# Patient Record
Sex: FEMALE | Race: WHITE | Employment: OTHER | URBAN - METROPOLITAN AREA
[De-identification: names, ages, dates, MRNs, and addresses within clinical notes are randomized per-mention and may not be internally consistent; named-entity substitution may affect disease eponyms.]

---

## 2018-12-10 ENCOUNTER — HOSPITAL ENCOUNTER (OUTPATIENT)
Dept: LAB | Age: 63
Discharge: HOME | End: 2018-12-10
Payer: COMMERCIAL

## 2018-12-10 ENCOUNTER — OFFICE VISIT (OUTPATIENT)
Dept: FAMILY MEDICINE CLINIC | Age: 63
End: 2018-12-10

## 2018-12-10 VITALS
TEMPERATURE: 97.4 F | BODY MASS INDEX: 29.19 KG/M2 | SYSTOLIC BLOOD PRESSURE: 112 MMHG | HEART RATE: 93 BPM | WEIGHT: 171 LBS | RESPIRATION RATE: 18 BRPM | HEIGHT: 64 IN | OXYGEN SATURATION: 96 % | DIASTOLIC BLOOD PRESSURE: 63 MMHG

## 2018-12-10 DIAGNOSIS — E66.3 OVERWEIGHT (BMI 25.0-29.9): ICD-10-CM

## 2018-12-10 DIAGNOSIS — Z01.89 ENCOUNTER FOR LABORATORY TEST: ICD-10-CM

## 2018-12-10 DIAGNOSIS — E07.9 THYROID DISORDER: ICD-10-CM

## 2018-12-10 DIAGNOSIS — Z00.00 GENERAL MEDICAL EXAM: Primary | ICD-10-CM

## 2018-12-10 DIAGNOSIS — F39 MOOD DISORDER (HCC): ICD-10-CM

## 2018-12-10 DIAGNOSIS — Z00.00 GENERAL MEDICAL EXAM: ICD-10-CM

## 2018-12-10 LAB
ALBUMIN SERPL-MCNC: 4 G/DL (ref 3.4–5)
ALBUMIN/GLOB SERPL: 1.2 {RATIO} (ref 0.8–1.7)
ALP SERPL-CCNC: 89 U/L (ref 45–117)
ALT SERPL-CCNC: 56 U/L (ref 13–56)
ANION GAP SERPL CALC-SCNC: 7 MMOL/L (ref 3–18)
AST SERPL-CCNC: 24 U/L (ref 15–37)
BASOPHILS # BLD: 0 K/UL (ref 0–0.1)
BASOPHILS NFR BLD: 0 % (ref 0–2)
BILIRUB SERPL-MCNC: 0.7 MG/DL (ref 0.2–1)
BUN SERPL-MCNC: 13 MG/DL (ref 7–18)
BUN/CREAT SERPL: 12 (ref 12–20)
CALCIUM SERPL-MCNC: 9.3 MG/DL (ref 8.5–10.1)
CHLORIDE SERPL-SCNC: 108 MMOL/L (ref 100–108)
CHOLEST SERPL-MCNC: 223 MG/DL
CO2 SERPL-SCNC: 26 MMOL/L (ref 21–32)
CREAT SERPL-MCNC: 1.09 MG/DL (ref 0.6–1.3)
DIFFERENTIAL METHOD BLD: ABNORMAL
EOSINOPHIL # BLD: 0.2 K/UL (ref 0–0.4)
EOSINOPHIL NFR BLD: 2 % (ref 0–5)
ERYTHROCYTE [DISTWIDTH] IN BLOOD BY AUTOMATED COUNT: 13.3 % (ref 11.6–14.5)
GLOBULIN SER CALC-MCNC: 3.3 G/DL (ref 2–4)
GLUCOSE SERPL-MCNC: 86 MG/DL (ref 74–99)
HCT VFR BLD AUTO: 45.3 % (ref 35–45)
HDLC SERPL-MCNC: 35 MG/DL (ref 40–60)
HDLC SERPL: 6.4 {RATIO} (ref 0–5)
HGB BLD-MCNC: 14.5 G/DL (ref 12–16)
LDLC SERPL CALC-MCNC: ABNORMAL MG/DL (ref 0–100)
LIPID PROFILE,FLP: ABNORMAL
LYMPHOCYTES # BLD: 3.1 K/UL (ref 0.9–3.6)
LYMPHOCYTES NFR BLD: 31 % (ref 21–52)
MCH RBC QN AUTO: 30.3 PG (ref 24–34)
MCHC RBC AUTO-ENTMCNC: 32 G/DL (ref 31–37)
MCV RBC AUTO: 94.8 FL (ref 74–97)
MONOCYTES # BLD: 0.5 K/UL (ref 0.05–1.2)
MONOCYTES NFR BLD: 5 % (ref 3–10)
NEUTS SEG # BLD: 6.3 K/UL (ref 1.8–8)
NEUTS SEG NFR BLD: 62 % (ref 40–73)
PLATELET # BLD AUTO: 309 K/UL (ref 135–420)
PMV BLD AUTO: 12.5 FL (ref 9.2–11.8)
POTASSIUM SERPL-SCNC: 4.4 MMOL/L (ref 3.5–5.5)
PROT SERPL-MCNC: 7.3 G/DL (ref 6.4–8.2)
RBC # BLD AUTO: 4.78 M/UL (ref 4.2–5.3)
SODIUM SERPL-SCNC: 141 MMOL/L (ref 136–145)
TRIGL SERPL-MCNC: 580 MG/DL (ref ?–150)
TSH SERPL DL<=0.05 MIU/L-ACNC: 1.2 UIU/ML (ref 0.36–3.74)
VLDLC SERPL CALC-MCNC: ABNORMAL MG/DL
WBC # BLD AUTO: 10 K/UL (ref 4.6–13.2)

## 2018-12-10 PROCEDURE — 80061 LIPID PANEL: CPT

## 2018-12-10 PROCEDURE — 80053 COMPREHEN METABOLIC PANEL: CPT

## 2018-12-10 PROCEDURE — 84443 ASSAY THYROID STIM HORMONE: CPT

## 2018-12-10 PROCEDURE — 85025 COMPLETE CBC W/AUTO DIFF WBC: CPT

## 2018-12-10 RX ORDER — SERTRALINE HYDROCHLORIDE 25 MG/1
25 TABLET, FILM COATED ORAL DAILY
Qty: 30 TAB | Refills: 1 | Status: SHIPPED | OUTPATIENT
Start: 2018-12-10 | End: 2019-01-04 | Stop reason: SDUPTHER

## 2018-12-10 RX ORDER — VALACYCLOVIR HYDROCHLORIDE 500 MG/1
TABLET, FILM COATED ORAL DAILY
COMMUNITY

## 2018-12-10 RX ORDER — ALENDRONATE SODIUM 35 MG/1
TABLET ORAL
COMMUNITY

## 2018-12-10 RX ORDER — GLUCOSAMINE/CHONDR SU A SOD 750-600 MG
2 TABLET ORAL DAILY
COMMUNITY

## 2018-12-10 RX ORDER — GLUCOSAMINE SULFATE 1500 MG
POWDER IN PACKET (EA) ORAL DAILY
COMMUNITY
End: 2022-06-01 | Stop reason: ALTCHOICE

## 2018-12-10 NOTE — PROGRESS NOTES
Chief Complaint Patient presents with [facility] Establish Care New Patient 1. Have you been to the ER, urgent care clinic since your last visit? Hospitalized since your last visit? No 
 
2. Have you seen or consulted any other health care providers outside of the [address] [place] [address] since your last visit? Include any pap smears or colon screening. No  
 
HPI Mia Sorensen comes in to establish care. Patient would like to have a general medical exam done. She does have episodes of anxiety that she is result in palpitations. These come on and off. No syncope or diaphoresis. She denies chest pain or shortness of breath. Discussed anxiety and the impact it can have on activities of daily living and quality of life. After discussion she is willing to try medication to help control anxiety. I will start her on sertraline 25 mg daily. I will follow-up in 1 month to see how she is doing. Patient states that in the past she has used medication for anxiety. She took this for about 6 months and it did help with symptoms. I will check labs today. I will also check her thyroid level. Patient does have a family history of thyroid disorder. Patient has a BMI of 29.35. We discussed normal BMI. She will do lifestyle and dietary modification to cut back on her weight. Past Medical History History reviewed. No pertinent past medical history. Surgical History Past Surgical History:  
Procedure Laterality Date  HX HYSTERECTOMY  2009 Medications Current Outpatient Medications Medication Sig Dispense Refill  valACYclovir (VALTREX) 500 mg tablet Take  by mouth daily.  alendronate (FOSAMAX) 35 mg tablet Take  by mouth every seven (7) days.  fish oil-omega-3 fatty acids 340-1,000 mg capsule Take 2 Caps by mouth daily.  glucosamine/chondr govea A sod (GLUCOSAMINE-CHONDROITIN) 750-600 mg tab Take 2 Tabs by mouth daily.  cholecalciferol (VITAMIN D3) 1,000 unit cap Take  by mouth daily. Allergies Allergies Allergen Reactions  Pcn [Penicillins] Unknown (comments) Family History Family History Problem Relation Age of Onset  Cancer Mother  Glaucoma Mother  Dementia Mother  Heart Disease Father  Cancer Maternal Grandmother Social History Social History Socioeconomic History  Marital status:  Spouse name: Not on file  Number of children: Not on file  Years of education: Not on file  Highest education level: Not on file Social Needs  Financial resource strain: Not on file  Food insecurity - worry: Not on file  Food insecurity - inability: Not on file  Transportation needs - medical: Not on file  Transportation needs - non-medical: Not on file Occupational History  Not on file Tobacco Use  Smoking status: Light Tobacco Smoker  Smokeless tobacco: Never Used Substance and Sexual Activity  Alcohol use: Yes Comment: social  
 Drug use: No  
 Sexual activity: Not on file Other Topics Concern  Not on file Social History Narrative  Not on file Review of Systems Review of Systems - History obtained from the patient General ROS: positive for  - fatigue and malaise 
negative for - chills or fever Psychological ROS: positive for - anxiety Ophthalmic ROS: negative ENT ROS: negative Allergy and Immunology ROS: negative Hematological and Lymphatic ROS: negative Endocrine ROS: negative Respiratory ROS: no cough, shortness of breath, or wheezing Cardiovascular ROS: no chest pain or dyspnea on exertion Gastrointestinal ROS: no abdominal pain, change in bowel habits, or black or bloody stools Genito-Urinary ROS: no dysuria, trouble voiding, or hematuria Musculoskeletal ROS: negative Neurological ROS: no TIA or stroke symptoms Dermatological ROS: negative Vital Signs Visit Vitals /63 (BP 1 Location: Left arm, BP Patient Position: Sitting) Pulse 93 Temp 97.4 °F (36.3 °C) (Oral) Resp 18 Ht 5' 4\" (1.626 m) Wt 171 lb (77.6 kg) SpO2 96% BMI 29.35 kg/m² Physical Exam 
Physical Examination: General appearance - alert, well appearing, and in no distress, oriented to person, place, and time, overweight, acyanotic, in no respiratory distress and well hydrated Mental status - alert, oriented to person, place, and time, normal mood, behavior, speech, dress, motor activity, and thought processes Eyes - pupils equal and reactive, extraocular eye movements intact Ears - bilateral TM's and external ear canals normal 
Nose - normal and patent, no erythema, discharge or polyps Mouth - mucous membranes moist, pharynx normal without lesions Neck - supple, no significant adenopathy Lymphatics - no palpable lymphadenopathy, no hepatosplenomegaly Chest - clear to auscultation, no wheezes, rales or rhonchi, symmetric air entry Heart - normal rate, regular rhythm, normal S1, S2, no murmurs, rubs, clicks or gallops Abdomen - soft, nontender, nondistended, no masses or organomegaly Back exam - full range of motion, no tenderness, palpable spasm or pain on motion Neurological - alert, oriented, normal speech, no focal findings or movement disorder noted Musculoskeletal - no joint tenderness, deformity or swelling Extremities - peripheral pulses normal, no pedal edema, no clubbing or cyanosis Results No results found for this or any previous visit. ASSESSMENT and PLAN 
  ICD-10-CM ICD-9-CM 1. General medical exam Z00.00 V70.9 CBC WITH AUTOMATED DIFF  
   METABOLIC PANEL, COMPREHENSIVE  
   LIPID PANEL 2. Mood disorder (HCC) F39 296.90 sertraline (ZOLOFT) 25 mg tablet 3. Overweight (BMI 25.0-29. 9) E66.3 278.02   
4. Thyroid disorder E07.9 246.9 TSH 3RD GENERATION 5.  Encounter for laboratory test Z01.89 V72.60 COLLECTION VENOUS BLOOD,VENIPUNCTURE  
 
 lab results and schedule of future lab studies reviewed with patient 
reviewed diet, exercise and weight control 
cardiovascular risk and specific lipid/LDL goals reviewed 
reviewed medications and side effects in detail Discussed the patient's BMI with her. The BMI follow up plan is as follows:  
dietary management education, guidance, and counseling 
encourage exercise 
monitor weight I have discussed the diagnosis with the patient and the intended plan of care as seen in the above orders. The patient has received an after-visit summary and questions were answered concerning future plans. I have discussed medication, side effects, and warnings with the patient in detail. The patient verbalized understanding and is in agreement with the plan of care. The patient will contact the office with any additional concerns.  
 
Wenceslao Romero MD

## 2018-12-10 NOTE — PATIENT INSTRUCTIONS
Body Mass Index: Care Instructions Your Care Instructions Body mass index (BMI) can help you see if your weight is raising your risk for health problems. It uses a formula to compare how much you weigh with how tall you are. · A BMI lower than 18.5 is considered underweight. · A BMI between 18.5 and 24.9 is considered healthy. · A BMI between 25 and 29.9 is considered overweight. A BMI of 30 or higher is considered obese. If your BMI is in the normal range, it means that you have a lower risk for weight-related health problems. If your BMI is in the overweight or obese range, you may be at increased risk for weight-related health problems, such as high blood pressure, heart disease, stroke, arthritis or joint pain, and diabetes. If your BMI is in the underweight range, you may be at increased risk for health problems such as fatigue, lower protection (immunity) against illness, muscle loss, bone loss, hair loss, and hormone problems. BMI is just one measure of your risk for weight-related health problems. You may be at higher risk for health problems if you are not active, you eat an unhealthy diet, or you drink too much alcohol or use tobacco products. Follow-up care is a key part of your treatment and safety. Be sure to make and go to all appointments, and call your doctor if you are having problems. It's also a good idea to know your test results and keep a list of the medicines you take. How can you care for yourself at home? · Practice healthy eating habits. This includes eating plenty of fruits, vegetables, whole grains, lean protein, and low-fat dairy. · If your doctor recommends it, get more exercise. Walking is a good choice. Bit by bit, increase the amount you walk every day. Try for at least 30 minutes on most days of the week. · Do not smoke. Smoking can increase your risk for health problems.  If you need help quitting, talk to your doctor about stop-smoking programs and medicines. These can increase your chances of quitting for good. · Limit alcohol to 2 drinks a day for men and 1 drink a day for women. Too much alcohol can cause health problems. If you have a BMI higher than 25 · Your doctor may do other tests to check your risk for weight-related health problems. This may include measuring the distance around your waist. A waist measurement of more than 40 inches in men or 35 inches in women can increase the risk of weight-related health problems. · Talk with your doctor about steps you can take to stay healthy or improve your health. You may need to make lifestyle changes to lose weight and stay healthy, such as changing your diet and getting regular exercise. If you have a BMI lower than 18.5 · Your doctor may do other tests to check your risk for health problems. · Talk with your doctor about steps you can take to stay healthy or improve your health. You may need to make lifestyle changes to gain or maintain weight and stay healthy, such as getting more healthy foods in your diet and doing exercises to build muscle. Where can you learn more? Go to http://nadine-min.info/. Enter S176 in the search box to learn more about \"Body Mass Index: Care Instructions. \" Current as of: October 13, 2016 Content Version: 11.4 © 9193-0060 [employer]. Care instructions adapted under license by [employer] (which disclaims liability or warranty for this information). If you have questions about a medical condition or this instruction, always ask your healthcare professional. [address] any warranty or liability for your use of this information.

## 2018-12-17 NOTE — PROGRESS NOTES
Please let patient know that her cholesterol level is elevated. Her triglyceride levels are also elevated. She is taking fish oil. I would recommend taking a statin or other cholesterol-lowering medication. She should also exercise and take a diet low in poorly saturated fats. If she is agreeable to this she should let us know and we will send in a prescription to her pharmacy. Recheck labs in 3 months.   Bekah Currie MD

## 2018-12-20 NOTE — PROGRESS NOTES
Patient returned call. Verified name and . Advised of lab results. She expresses understanding. She mentions that she has taken Simvastatin in the past but developed severe back pain and extreme exhaustion while taking it. She wants to know if there is an alternative to the Simvastatin. She uses [facility] in [place]. Advised I would check with Dr. Dottie Mullins and give her a call back.

## 2018-12-31 RX ORDER — EZETIMIBE 10 MG/1
10 TABLET ORAL DAILY
Qty: 30 TAB | Refills: 3 | Status: SHIPPED | OUTPATIENT
Start: 2018-12-31 | End: 2019-03-25 | Stop reason: SDUPTHER

## 2019-01-04 DIAGNOSIS — F39 MOOD DISORDER (HCC): ICD-10-CM

## 2019-01-04 RX ORDER — SERTRALINE HYDROCHLORIDE 25 MG/1
25 TABLET, FILM COATED ORAL DAILY
Qty: 90 TAB | Refills: 0 | Status: SHIPPED | OUTPATIENT
Start: 2019-01-04 | End: 2019-03-28 | Stop reason: SDUPTHER

## 2019-01-07 NOTE — PROGRESS NOTES
Patient states she has already received the Zetia Rx and began taking that. Advised to continue the medication if she is not having any issues and can discuss this further at her appt on 1/10/19.

## 2019-01-10 ENCOUNTER — OFFICE VISIT (OUTPATIENT)
Dept: FAMILY MEDICINE CLINIC | Age: 64
End: 2019-01-10

## 2019-01-10 VITALS
RESPIRATION RATE: 18 BRPM | BODY MASS INDEX: 28.85 KG/M2 | HEART RATE: 84 BPM | TEMPERATURE: 96.7 F | WEIGHT: 169 LBS | OXYGEN SATURATION: 98 % | HEIGHT: 64 IN | DIASTOLIC BLOOD PRESSURE: 73 MMHG | SYSTOLIC BLOOD PRESSURE: 117 MMHG

## 2019-01-10 DIAGNOSIS — E78.5 DYSLIPIDEMIA: ICD-10-CM

## 2019-01-10 DIAGNOSIS — F39 MOOD DISORDER (HCC): Primary | ICD-10-CM

## 2019-01-10 DIAGNOSIS — Z01.89 ENCOUNTER FOR LABORATORY TEST: ICD-10-CM

## 2019-01-10 DIAGNOSIS — Z11.59 NEED FOR HEPATITIS C SCREENING TEST: ICD-10-CM

## 2019-02-06 DIAGNOSIS — Z11.59 NEED FOR HEPATITIS C SCREENING TEST: ICD-10-CM

## 2019-02-27 ENCOUNTER — OFFICE VISIT (OUTPATIENT)
Dept: FAMILY MEDICINE CLINIC | Age: 64
End: 2019-02-27

## 2019-02-27 VITALS
HEIGHT: 64 IN | SYSTOLIC BLOOD PRESSURE: 124 MMHG | HEART RATE: 83 BPM | RESPIRATION RATE: 18 BRPM | WEIGHT: 172 LBS | DIASTOLIC BLOOD PRESSURE: 75 MMHG | OXYGEN SATURATION: 96 % | TEMPERATURE: 96.6 F | BODY MASS INDEX: 29.37 KG/M2

## 2019-02-27 DIAGNOSIS — F39 MOOD DISORDER (HCC): ICD-10-CM

## 2019-02-27 DIAGNOSIS — E66.3 OVERWEIGHT (BMI 25.0-29.9): ICD-10-CM

## 2019-02-27 DIAGNOSIS — J11.1 INFLUENZA: Primary | ICD-10-CM

## 2019-02-27 LAB
FLUAV+FLUBV AG NOSE QL IA.RAPID: NEGATIVE POS/NEG
FLUAV+FLUBV AG NOSE QL IA.RAPID: POSITIVE POS/NEG
VALID INTERNAL CONTROL?: YES

## 2019-02-27 RX ORDER — OSELTAMIVIR PHOSPHATE 75 MG/1
75 CAPSULE ORAL 2 TIMES DAILY
Qty: 10 CAP | Refills: 0 | Status: SHIPPED | OUTPATIENT
Start: 2019-02-27 | End: 2019-03-04

## 2019-02-27 NOTE — PROGRESS NOTES
Chief Complaint   Patient presents with    Generalized Body Aches    Chills    Cough    Diarrhea     1. Have you been to the ER, urgent care clinic since your last visit? Hospitalized since your last visit? No    2. Have you seen or consulted any other health care providers outside of the [address] [place] [address] since your last visit? Include any pap smears or colon screening. No     HPI  [facility] patient comes in for acute care. Patient has cough that is productive of clear phlegm. Associated generalized body aches, fever and chills. All this started after she had come back home from a cruise in [place]. No hemoptysis or chest pain noted. No wheeze or shortness of breath. We checked a rapid flu test and this is positive for influenza B. I will treat with Tamiflu. Patient has been taking Coricidin and that she will continue with this for the runny nose and aches. Patient has a mood disorder. She is on Zoloft 25 mg daily. Feels this medication is stable enough for her and is helping. We will continue with the current treatment plan. Patient has a BMI of 29.52. She should continue with lifestyle and dietary modifications. Past Medical History  History reviewed. No pertinent past medical history. Surgical History  Past Surgical History:   Procedure Laterality Date    HX HYSTERECTOMY  2009        Medications  Current Outpatient Medications   Medication Sig Dispense Refill    sertraline (ZOLOFT) 25 mg tablet Take 1 Tab by mouth daily. 90 Tab 0    ezetimibe (ZETIA) 10 mg tablet Take 1 Tab by mouth daily. 30 Tab 3    valACYclovir (VALTREX) 500 mg tablet Take  by mouth daily.  alendronate (FOSAMAX) 35 mg tablet Take  by mouth every seven (7) days.  fish oil-omega-3 fatty acids 340-1,000 mg capsule Take 2 Caps by mouth daily.  glucosamine/chondr govea A sod (GLUCOSAMINE-CHONDROITIN) 750-600 mg tab Take 2 Tabs by mouth daily.       cholecalciferol (VITAMIN D3) 1,000 unit cap Take  by mouth daily. Allergies  Allergies   Allergen Reactions    Pcn [Penicillins] Unknown (comments)       Family History  Family History   Problem Relation Age of Onset    Cancer Mother     Glaucoma Mother     Dementia Mother     Heart Disease Father     Cancer Maternal Grandmother        Social History  Social History     Socioeconomic History    Marital status:      Spouse name: Not on file    Number of children: Not on file    Years of education: Not on file    Highest education level: Not on file   Social Needs    Financial resource strain: Not on file    Food insecurity - worry: Not on file    Food insecurity - inability: Not on file   [employer] needs - medical: Not on file   [employer] needs - non-medical: Not on file   Occupational History    Not on file   Tobacco Use    Smoking status: Light Tobacco Smoker    Smokeless tobacco: Never Used   Substance and Sexual Activity    Alcohol use: Yes     Comment: social    Drug use: No    Sexual activity: Not on file   Other Topics Concern    Not on file   Social History Narrative    Not on file       Review of Systems  Review of Systems -review of all systems is negative except as noted above in the HPI.     Vital Signs  Visit Vitals  /75 (BP 1 Location: Left arm, BP Patient Position: Sitting)   Pulse 83   Temp 96.6 °F (35.9 °C) (Oral)   Resp 18   Ht 5' 4\" (1.626 m)   Wt 172 lb (78 kg)   SpO2 96%   BMI 29.52 kg/m²         Physical Exam  Physical Examination: General appearance - oriented to person, place, and time, acyanotic, in no respiratory distress and well hydrated  Mental status - alert, oriented to person, place, and time  Eyes - sclera anicteric  Ears - bilateral TM's and external ear canals normal  Nose - mucosal congestion, mucosal erythema and clear rhinorrhea  Mouth - mucous membranes moist, pharynx normal without lesions  Neck - supple, no significant adenopathy  Lymphatics - no palpable lymphadenopathy  Chest - clear to auscultation, no wheezes, rales or rhonchi, symmetric air entry  Heart - normal rate and regular rhythm, S1 and S2 normal  Neurological - motor and sensory grossly normal bilaterally  Extremities - intact peripheral pulses    Results  Results for orders placed or performed during the hospital encounter of 12/10/18   CBC WITH AUTOMATED DIFF   Result Value Ref Range    WBC 10.0 4.6 - 13.2 K/uL    RBC 4.78 4.20 - 5.30 M/uL    HGB 14.5 12.0 - 16.0 g/dL    HCT 45.3 (H) 35.0 - 45.0 %    MCV 94.8 74.0 - 97.0 FL    MCH 30.3 24.0 - 34.0 PG    MCHC 32.0 31.0 - 37.0 g/dL    RDW 13.3 11.6 - 14.5 %    PLATELET 784 459 - 508 K/uL    MPV 12.5 (H) 9.2 - 11.8 FL    NEUTROPHILS 62 40 - 73 %    LYMPHOCYTES 31 21 - 52 %    MONOCYTES 5 3 - 10 %    EOSINOPHILS 2 0 - 5 %    BASOPHILS 0 0 - 2 %    ABS. NEUTROPHILS 6.3 1.8 - 8.0 K/UL    ABS. LYMPHOCYTES 3.1 0.9 - 3.6 K/UL    ABS. MONOCYTES 0.5 0.05 - 1.2 K/UL    ABS. EOSINOPHILS 0.2 0.0 - 0.4 K/UL    ABS. BASOPHILS 0.0 0.0 - 0.1 K/UL    DF AUTOMATED     METABOLIC PANEL, COMPREHENSIVE   Result Value Ref Range    Sodium 141 136 - 145 mmol/L    Potassium 4.4 3.5 - 5.5 mmol/L    Chloride 108 100 - 108 mmol/L    CO2 26 21 - 32 mmol/L    Anion gap 7 3.0 - 18 mmol/L    Glucose 86 74 - 99 mg/dL    BUN 13 7.0 - 18 MG/DL    Creatinine 1.09 0.6 - 1.3 MG/DL    BUN/Creatinine ratio 12 12 - 20      GFR est AA >60 >60 ml/min/1.73m2    GFR est non-AA 51 (L) >60 ml/min/1.73m2    Calcium 9.3 8.5 - 10.1 MG/DL    Bilirubin, total 0.7 0.2 - 1.0 MG/DL    ALT (SGPT) 56 13 - 56 U/L    AST (SGOT) 24 15 - 37 U/L    Alk.  phosphatase 89 45 - 117 U/L    Protein, total 7.3 6.4 - 8.2 g/dL    Albumin 4.0 3.4 - 5.0 g/dL    Globulin 3.3 2.0 - 4.0 g/dL    A-G Ratio 1.2 0.8 - 1.7     LIPID PANEL   Result Value Ref Range    LIPID PROFILE          Cholesterol, total 223 (H) <200 MG/DL    Triglyceride 580 (H) <150 MG/DL    HDL Cholesterol 35 (L) 40 - 60 MG/DL    LDL, calculated  0 - 100 MG/DL LDL AND VLDL CHOLESTEROL NOT CALCULATED WHEN TRIGLYCERIDES >400 MG/DL OR HDL CHOLESTEROL <20 MG/DL    VLDL, calculated  MG/DL     Calculation not valid with this patient's other Lipid values. CHOL/HDL Ratio 6.4 (H) 0 - 5.0     TSH 3RD GENERATION   Result Value Ref Range    TSH 1.20 0.36 - 3.74 uIU/mL       ASSESSMENT and PLAN    ICD-10-CM ICD-9-CM    1. Influenza J11.1 487.1 AMB POC TANVIR INFLUENZA A/B TEST      oseltamivir (TAMIFLU) 75 mg capsule   2. Mood disorder (HCC) F39 296.90    3. Overweight (BMI 25.0-29. 9) E66.3 278.02      lab results and schedule of future lab studies reviewed with patient  reviewed diet, exercise and weight control  reviewed medications and side effects in detail    I have discussed the diagnosis with the patient and the intended plan of care as seen in the above orders. The patient has received an after-visit summary and questions were answered concerning future plans. I have discussed medication, side effects, and warnings with the patient in detail. The patient verbalized understanding and is in agreement with the plan of care. The patient will contact the office with any additional concerns.     Lazarus Book, MD

## 2019-03-25 RX ORDER — EZETIMIBE 10 MG/1
10 TABLET ORAL DAILY
Qty: 90 TAB | Refills: 1 | Status: SHIPPED | OUTPATIENT
Start: 2019-03-25 | End: 2019-09-19 | Stop reason: SDUPTHER

## 2019-03-28 DIAGNOSIS — F39 MOOD DISORDER (HCC): ICD-10-CM

## 2019-03-28 RX ORDER — SERTRALINE HYDROCHLORIDE 25 MG/1
TABLET, FILM COATED ORAL
Qty: 90 TAB | Refills: 0 | Status: SHIPPED | OUTPATIENT
Start: 2019-03-28 | End: 2019-07-05 | Stop reason: SDUPTHER

## 2019-04-03 ENCOUNTER — OFFICE VISIT (OUTPATIENT)
Dept: FAMILY MEDICINE CLINIC | Age: 64
End: 2019-04-03

## 2019-04-03 VITALS
SYSTOLIC BLOOD PRESSURE: 103 MMHG | TEMPERATURE: 97 F | RESPIRATION RATE: 20 BRPM | HEART RATE: 95 BPM | BODY MASS INDEX: 29.37 KG/M2 | OXYGEN SATURATION: 97 % | HEIGHT: 64 IN | WEIGHT: 172 LBS | DIASTOLIC BLOOD PRESSURE: 58 MMHG

## 2019-04-03 DIAGNOSIS — M21.611 BUNION OF GREAT TOE OF RIGHT FOOT: ICD-10-CM

## 2019-04-03 DIAGNOSIS — E78.5 DYSLIPIDEMIA: Primary | ICD-10-CM

## 2019-04-03 DIAGNOSIS — F39 MOOD DISORDER (HCC): ICD-10-CM

## 2019-04-03 NOTE — PROGRESS NOTES
HPI 
Dona Mcclain comes in for f/u care. Dyslipidemia: on zetia, stable. Will recheck labs. Do fasting lipid panel. Right foot: has bunion base of big toe. Causes pain and discomfort when rubbing against shoe. Discussed options. apply corn caps and if worsening consider podiatry eval. 
Mood d/o: on zoloft, doing well. Past Medical History History reviewed. No pertinent past medical history. Surgical History Past Surgical History:  
Procedure Laterality Date  HX HYSTERECTOMY  2009 Medications Current Outpatient Medications Medication Sig Dispense Refill  sertraline (ZOLOFT) 25 mg tablet TAKE 1 TABLET BY MOUTH EVERY DAY 90 Tab 0  
 ezetimibe (ZETIA) 10 mg tablet Take 1 Tab by mouth daily. 90 Tab 1  valACYclovir (VALTREX) 500 mg tablet Take  by mouth daily.  alendronate (FOSAMAX) 35 mg tablet Take  by mouth every seven (7) days.  fish oil-omega-3 fatty acids 340-1,000 mg capsule Take 2 Caps by mouth daily.  glucosamine/chondr govea A sod (GLUCOSAMINE-CHONDROITIN) 750-600 mg tab Take 2 Tabs by mouth daily.  cholecalciferol (VITAMIN D3) 1,000 unit cap Take  by mouth daily. Allergies Allergies Allergen Reactions  Pcn [Penicillins] Unknown (comments) Family History Family History Problem Relation Age of Onset  Cancer Mother  Glaucoma Mother  Dementia Mother  Heart Disease Father  Cancer Maternal Grandmother Social History Social History Socioeconomic History  Marital status:  Spouse name: Not on file  Number of children: Not on file  Years of education: Not on file  Highest education level: Not on file Occupational History  Not on file Social Needs  Financial resource strain: Not on file  Food insecurity:  
  Worry: Not on file Inability: Not on file  Transportation needs:  
  Medical: Not on file Non-medical: Not on file Tobacco Use  Smoking status: Former Smoker  Smokeless tobacco: Never Used Substance and Sexual Activity  Alcohol use: Yes Comment: social  
 Drug use: No  
 Sexual activity: Not on file Lifestyle  Physical activity:  
  Days per week: Not on file Minutes per session: Not on file  Stress: Not on file Relationships  Social connections:  
  Talks on phone: Not on file Gets together: Not on file Attends Spiritism service: Not on file Active member of club or organization: Not on file Attends meetings of clubs or organizations: Not on file Relationship status: Not on file  Intimate partner violence:  
  Fear of current or ex partner: Not on file Emotionally abused: Not on file Physically abused: Not on file Forced sexual activity: Not on file Other Topics Concern  Not on file Social History Narrative  Not on file Review of Systems Review of Systems - Review of all systems is negative except as noted above in the HPI. Vital Signs Visit Vitals /58 (BP 1 Location: Left arm, BP Patient Position: Sitting) Pulse 95 Temp 97 °F (36.1 °C) (Oral) Resp 20 Ht 5' 4\" (1.626 m) Wt 172 lb (78 kg) SpO2 97% BMI 29.52 kg/m² Physical Exam 
Physical Examination: General appearance - alert, well appearing, and in no distress, oriented to person, place, and time and acyanotic, in no respiratory distress Mental status - alert, oriented to person, place, and time, affect appropriate to mood Mouth - mucous membranes moist, pharynx normal without lesions Neck - supple, no significant adenopathy Lymphatics - no palpable lymphadenopathy Chest - clear to auscultation, no wheezes, rales or rhonchi, symmetric air entry Heart - S1 and S2 normal 
Neurological - motor and sensory grossly normal bilaterally Musculoskeletal -right foot with a small bunion lateral aspect at the base of the big toe Extremities - no pedal edema noted, intact peripheral pulses Results Results for orders placed or performed in visit on 02/27/19 AMB POC TANVIR INFLUENZA A/B TEST Result Value Ref Range VALID INTERNAL CONTROL POC Yes Influenza A Ag POC Positive Negative Pos/Neg Influenza B Ag POC Negative Negative Pos/Neg  
 
 
ASSESSMENT and PLAN 
  ICD-10-CM ICD-9-CM 1. Dyslipidemia E78.5 272.4 LIPID PANEL  
   HEPATIC FUNCTION PANEL 2. Mood disorder (HCC) F39 296.90   
3. Bunion of great toe of right foot M21.611 727.1   
 
lab results and schedule of future lab studies reviewed with patient 
reviewed diet, exercise and weight control 
cardiovascular risk and specific lipid/LDL goals reviewed 
reviewed medications and side effects in detail I have discussed the diagnosis with the patient and the intended plan of care as seen in the above orders. The patient has received an after-visit summary and questions were answered concerning future plans. I have discussed medication, side effects, and warnings with the patient in detail. The patient verbalized understanding and is in agreement with the plan of care. The patient will contact the office with any additional concerns.  
 
Ever Kwan MD

## 2019-04-08 ENCOUNTER — HOSPITAL ENCOUNTER (OUTPATIENT)
Dept: LAB | Age: 64
Discharge: HOME | End: 2019-04-08
Payer: COMMERCIAL

## 2019-04-08 ENCOUNTER — LAB ONLY (OUTPATIENT)
Dept: FAMILY MEDICINE CLINIC | Age: 64
End: 2019-04-08

## 2019-04-08 DIAGNOSIS — E78.5 DYSLIPIDEMIA: ICD-10-CM

## 2019-04-08 DIAGNOSIS — Z01.89 ENCOUNTER FOR LABORATORY TEST: Primary | ICD-10-CM

## 2019-04-08 LAB
ALBUMIN SERPL-MCNC: 4.2 G/DL (ref 3.4–5)
ALBUMIN/GLOB SERPL: 1.5 {RATIO} (ref 0.8–1.7)
ALP SERPL-CCNC: 91 U/L (ref 45–117)
ALT SERPL-CCNC: 47 U/L (ref 13–56)
AST SERPL-CCNC: 29 U/L (ref 15–37)
BILIRUB DIRECT SERPL-MCNC: 0.1 MG/DL (ref 0–0.2)
BILIRUB SERPL-MCNC: 0.8 MG/DL (ref 0.2–1)
CHOLEST SERPL-MCNC: 197 MG/DL
GLOBULIN SER CALC-MCNC: 2.8 G/DL (ref 2–4)
HDLC SERPL-MCNC: 36 MG/DL (ref 40–60)
HDLC SERPL: 5.5 {RATIO} (ref 0–5)
LDLC SERPL CALC-MCNC: ABNORMAL MG/DL (ref 0–100)
LIPID PROFILE,FLP: ABNORMAL
PROT SERPL-MCNC: 7 G/DL (ref 6.4–8.2)
TRIGL SERPL-MCNC: 420 MG/DL (ref ?–150)
VLDLC SERPL CALC-MCNC: ABNORMAL MG/DL

## 2019-04-08 PROCEDURE — 80076 HEPATIC FUNCTION PANEL: CPT

## 2019-04-08 PROCEDURE — 80061 LIPID PANEL: CPT

## 2019-04-08 NOTE — PROGRESS NOTES
Patient in at this time for lab visit. Venipunture to right left forearm at this time and a 2nd attempt to right forearm. Patient tolerated well with a little discomfort.  No other concerns voiced at this time

## 2019-04-23 NOTE — PROGRESS NOTES
Please let patient know her triglyceride level is elevated. She is taking Zetia. She would benefit from taking a statin medication. If she is allergic to statins she should let me know. I will then give Lopid to help bring down the triglycerides and she will continue taking the Zetia.   Carri Farmer MD

## 2019-05-01 NOTE — PROGRESS NOTES
Spoke with patient at this time. Informed patient at this time. Patient verbalized understanding. Patient states she is willing to take medication. Please send in Lopid.

## 2019-05-02 RX ORDER — GEMFIBROZIL 600 MG/1
600 TABLET, FILM COATED ORAL 2 TIMES DAILY
Qty: 60 TAB | Refills: 2 | Status: SHIPPED | OUTPATIENT
Start: 2019-05-02 | End: 2019-07-29 | Stop reason: SDUPTHER

## 2019-05-03 ENCOUNTER — TELEPHONE (OUTPATIENT)
Dept: FAMILY MEDICINE CLINIC | Age: 64
End: 2019-05-03

## 2019-05-03 NOTE — TELEPHONE ENCOUNTER
Patient had a question about her new medication. Want to know if she should to continue taking the older medication as well or not.  Please advise pt when available

## 2019-05-06 NOTE — TELEPHONE ENCOUNTER
Called patient at this time. No answer. LVM informing patient that she is to take both Zetia and Lopid.  Left a message informing patient is she have further questions she can call back

## 2019-07-05 DIAGNOSIS — F39 MOOD DISORDER (HCC): ICD-10-CM

## 2019-07-07 RX ORDER — SERTRALINE HYDROCHLORIDE 25 MG/1
TABLET, FILM COATED ORAL
Qty: 90 TAB | Refills: 0 | Status: SHIPPED | OUTPATIENT
Start: 2019-07-07 | End: 2019-09-30 | Stop reason: SDUPTHER

## 2019-08-02 RX ORDER — GEMFIBROZIL 600 MG/1
TABLET, FILM COATED ORAL
Qty: 180 TAB | Refills: 0 | Status: SHIPPED | OUTPATIENT
Start: 2019-08-02 | End: 2019-11-13 | Stop reason: SDUPTHER

## 2019-08-02 NOTE — TELEPHONE ENCOUNTER
Requested Prescriptions     Pending Prescriptions Disp Refills    gemfibrozil (LOPID) 600 mg tablet [Pharmacy Med Name: GEMFIBROZIL 600 MG TABLET] 180 Tab 0     Sig: TAKE 1 TABLET BY MOUTH TWICE A DAY     Requested 7/29/19

## 2019-09-19 RX ORDER — EZETIMIBE 10 MG/1
TABLET ORAL
Qty: 90 TAB | Refills: 1 | Status: SHIPPED | OUTPATIENT
Start: 2019-09-19 | End: 2020-03-12

## 2019-09-30 DIAGNOSIS — F39 MOOD DISORDER (HCC): ICD-10-CM

## 2019-09-30 RX ORDER — SERTRALINE HYDROCHLORIDE 25 MG/1
TABLET, FILM COATED ORAL
Qty: 90 TAB | Refills: 0 | Status: SHIPPED | OUTPATIENT
Start: 2019-09-30 | End: 2019-10-03 | Stop reason: SDUPTHER

## 2019-10-03 ENCOUNTER — OFFICE VISIT (OUTPATIENT)
Dept: FAMILY MEDICINE CLINIC | Age: 64
End: 2019-10-03

## 2019-10-03 VITALS
HEIGHT: 64 IN | DIASTOLIC BLOOD PRESSURE: 73 MMHG | HEART RATE: 75 BPM | SYSTOLIC BLOOD PRESSURE: 133 MMHG | OXYGEN SATURATION: 100 % | WEIGHT: 170 LBS | BODY MASS INDEX: 29.02 KG/M2 | TEMPERATURE: 98.5 F | RESPIRATION RATE: 16 BRPM

## 2019-10-03 DIAGNOSIS — E78.5 DYSLIPIDEMIA: ICD-10-CM

## 2019-10-03 DIAGNOSIS — M81.0 OSTEOPOROSIS, UNSPECIFIED OSTEOPOROSIS TYPE, UNSPECIFIED PATHOLOGICAL FRACTURE PRESENCE: ICD-10-CM

## 2019-10-03 DIAGNOSIS — E66.3 OVERWEIGHT (BMI 25.0-29.9): ICD-10-CM

## 2019-10-03 DIAGNOSIS — Z01.89 ENCOUNTER FOR LABORATORY EXAMINATION: ICD-10-CM

## 2019-10-03 DIAGNOSIS — F39 MOOD DISORDER (HCC): Primary | ICD-10-CM

## 2019-10-03 RX ORDER — SERTRALINE HYDROCHLORIDE 25 MG/1
TABLET, FILM COATED ORAL
Qty: 90 TAB | Refills: 2 | Status: SHIPPED | OUTPATIENT
Start: 2019-10-03 | End: 2020-08-06 | Stop reason: SDUPTHER

## 2019-10-03 NOTE — PROGRESS NOTES
Chief Complaint   Patient presents with    Follow-up     dyslipidemia     1. Have you been to the ER, urgent care clinic since your last visit? Hospitalized since your last visit? No    2. Have you seen or consulted any other health care providers outside of the [address] [place] [address] since your last visit? Include any pap smears or colon screening. No     HPI  Meena Bhatia comes in for f/u care. Mood d/o: on zoloft stable, will send in refill of medication. Dyslipidemia: on fish oil, zetia and gemfibrozil. Will check lipid panel today. Osteoporosis: Patient is on Fosamax. Stable. Continue current treatment plan. Overweight: Patient has a BMI of 29.18. Will intensify lifestyle and dietary modification. Past Medical History  History reviewed. No pertinent past medical history. Surgical History  Past Surgical History:   Procedure Laterality Date    HX HYSTERECTOMY  2009        Medications  Current Outpatient Medications   Medication Sig Dispense Refill    sertraline (ZOLOFT) 25 mg tablet TAKE 1 TABLET BY MOUTH EVERY DAY 90 Tab 0    ezetimibe (ZETIA) 10 mg tablet TAKE 1 TABLET BY MOUTH EVERY DAY 90 Tab 1    gemfibrozil (LOPID) 600 mg tablet TAKE 1 TABLET BY MOUTH TWICE A  Tab 0    valACYclovir (VALTREX) 500 mg tablet Take  by mouth daily.  alendronate (FOSAMAX) 35 mg tablet Take  by mouth every seven (7) days.  fish oil-omega-3 fatty acids 340-1,000 mg capsule Take 2 Caps by mouth daily.  glucosamine/chondr govea A sod (GLUCOSAMINE-CHONDROITIN) 750-600 mg tab Take 2 Tabs by mouth daily.  cholecalciferol (VITAMIN D3) 1,000 unit cap Take  by mouth daily.          Allergies  Allergies   Allergen Reactions    Pcn [Penicillins] Unknown (comments)       Family History  Family History   Problem Relation Age of Onset   25 [facility] Cancer Mother     Glaucoma Mother     Dementia Mother     Heart Disease Father     Cancer Maternal Grandmother        Social History  Social History Socioeconomic History    Marital status:      Spouse name: Not on file    Number of children: Not on file    Years of education: Not on file    Highest education level: Not on file   Occupational History    Not on file   Social Needs    Financial resource strain: Not on file    Food insecurity:     Worry: Not on file     Inability: Not on file    Transportation needs:     Medical: Not on file     Non-medical: Not on file   Tobacco Use    Smoking status: Former Smoker    Smokeless tobacco: Never Used   Substance and Sexual Activity    Alcohol use: Yes     Comment: social    Drug use: No    Sexual activity: Not on file   Lifestyle    Physical activity:     Days per week: Not on file     Minutes per session: Not on file    Stress: Not on file   Relationships    Social connections:     Talks on phone: Not on file     Gets together: Not on file     Attends Mosque service: Not on file     Active member of club or organization: Not on file     Attends meetings of clubs or organizations: Not on file     Relationship status: Not on file    Intimate partner violence:     Fear of current or ex partner: Not on file     Emotionally abused: Not on file     Physically abused: Not on file     Forced sexual activity: Not on file   Other Topics Concern    Not on file   Social History Narrative    Not on file       Review of Systems  Review of Systems - Review of all systems is negative except as noted above in the HPI.     Vital Signs  Visit Vitals  /73 (BP 1 Location: Left arm, BP Patient Position: Sitting)   Pulse 75   Temp 98.5 °F (36.9 °C) (Oral)   Resp 16   Ht 5' 4\" (1.626 m)   Wt 170 lb (77.1 kg)   SpO2 100%   BMI 29.18 kg/m²         Physical Exam  Physical Examination: General appearance - alert, well appearing, and in no distress, oriented to person, place, and time and acyanotic, in no respiratory distress  Mental status - alert, oriented to person, place, and time, affect appropriate to mood  Neck - supple, no significant adenopathy  Lymphatics - no palpable lymphadenopathy, no hepatosplenomegaly  Chest - clear to auscultation, no wheezes, rales or rhonchi, symmetric air entry  Heart - S1 and S2 normal  Neurological - neck supple without rigidity  Extremities - intact peripheral pulses    Results  Results for orders placed or performed during the hospital encounter of 04/08/19   LIPID PANEL   Result Value Ref Range    LIPID PROFILE          Cholesterol, total 197 <200 MG/DL    Triglyceride 420 (H) <150 MG/DL    HDL Cholesterol 36 (L) 40 - 60 MG/DL    LDL, calculated  0 - 100 MG/DL     LDL AND VLDL CHOLESTEROL NOT CALCULATED WHEN TRIGLYCERIDES >400 MG/DL OR HDL CHOLESTEROL <20 MG/DL    VLDL, calculated  MG/DL     Calculation not valid with this patient's other Lipid values. CHOL/HDL Ratio 5.5 (H) 0 - 5.0     HEPATIC FUNCTION PANEL   Result Value Ref Range    Protein, total 7.0 6.4 - 8.2 g/dL    Albumin 4.2 3.4 - 5.0 g/dL    Globulin 2.8 2.0 - 4.0 g/dL    A-G Ratio 1.5 0.8 - 1.7      Bilirubin, total 0.8 0.2 - 1.0 MG/DL    Bilirubin, direct 0.1 0.0 - 0.2 MG/DL    Alk. phosphatase 91 45 - 117 U/L    AST (SGOT) 29 15 - 37 U/L    ALT (SGPT) 47 13 - 56 U/L       ASSESSMENT and PLAN    ICD-10-CM ICD-9-CM    1. Mood disorder (HCC) F39 296.90 sertraline (ZOLOFT) 25 mg tablet      CBC W/O DIFF      METABOLIC PANEL, COMPREHENSIVE      LIPID PANEL   2. Encounter for laboratory examination Z01.89 V72.60 COLLECTION VENOUS BLOOD,VENIPUNCTURE   3. Dyslipidemia E78.5 272.4    4. Osteoporosis, unspecified osteoporosis type, unspecified pathological fracture presence M81.0 733.00    Discussed the patient's BMI with her.   The BMI follow up plan is as follows:     dietary management education, guidance, and counseling  encourage exercise  monitor weight  lab results and schedule of future lab studies reviewed with patient  reviewed diet, exercise and weight control  reviewed medications and side effects in detail      I have discussed the diagnosis with the patient and the intended plan of care as seen in the above orders. The patient has received an after-visit summary and questions were answered concerning future plans. I have discussed medication, side effects, and warnings with the patient in detail. The patient verbalized understanding and is in agreement with the plan of care. The patient will contact the office with any additional concerns. Maribeth Jones MD    PLEASE NOTE:   This document has been produced using voice recognition software.  Unrecognized errors in transcription may be present

## 2019-10-03 NOTE — PROGRESS NOTES
Venipuncture performed to left arm successfully. Patient tolerated procedure. Per patient request, I have noted on order to please send labs to [facility].

## 2019-10-03 NOTE — PATIENT INSTRUCTIONS
Body Mass Index: Care Instructions Your Care Instructions Body mass index (BMI) can help you see if your weight is raising your risk for health problems. It uses a formula to compare how much you weigh with how tall you are. · A BMI lower than 18.5 is considered underweight. · A BMI between 18.5 and 24.9 is considered healthy. · A BMI between 25 and 29.9 is considered overweight. A BMI of 30 or higher is considered obese. If your BMI is in the normal range, it means that you have a lower risk for weight-related health problems. If your BMI is in the overweight or obese range, you may be at increased risk for weight-related health problems, such as high blood pressure, heart disease, stroke, arthritis or joint pain, and diabetes. If your BMI is in the underweight range, you may be at increased risk for health problems such as fatigue, lower protection (immunity) against illness, muscle loss, bone loss, hair loss, and hormone problems. BMI is just one measure of your risk for weight-related health problems. You may be at higher risk for health problems if you are not active, you eat an unhealthy diet, or you drink too much alcohol or use tobacco products. Follow-up care is a key part of your treatment and safety. Be sure to make and go to all appointments, and call your doctor if you are having problems. It's also a good idea to know your test results and keep a list of the medicines you take. How can you care for yourself at home? · Practice healthy eating habits. This includes eating plenty of fruits, vegetables, whole grains, lean protein, and low-fat dairy. · If your doctor recommends it, get more exercise. Walking is a good choice. Bit by bit, increase the amount you walk every day. Try for at least 30 minutes on most days of the week. · Do not smoke. Smoking can increase your risk for health problems.  If you need help quitting, talk to your doctor about stop-smoking programs and medicines. These can increase your chances of quitting for good. · Limit alcohol to 2 drinks a day for men and 1 drink a day for women. Too much alcohol can cause health problems. If you have a BMI higher than 25 · Your doctor may do other tests to check your risk for weight-related health problems. This may include measuring the distance around your waist. A waist measurement of more than 40 inches in men or 35 inches in women can increase the risk of weight-related health problems. · Talk with your doctor about steps you can take to stay healthy or improve your health. You may need to make lifestyle changes to lose weight and stay healthy, such as changing your diet and getting regular exercise. If you have a BMI lower than 18.5 · Your doctor may do other tests to check your risk for health problems. · Talk with your doctor about steps you can take to stay healthy or improve your health. You may need to make lifestyle changes to gain or maintain weight and stay healthy, such as getting more healthy foods in your diet and doing exercises to build muscle. Where can you learn more? Go to http://nadine-min.info/. Enter S176 in the search box to learn more about \"Body Mass Index: Care Instructions. \" Current as of: October 13, 2016 Content Version: 11.4 © 5548-6116 [employer]. Care instructions adapted under license by [employer] (which disclaims liability or warranty for this information). If you have questions about a medical condition or this instruction, always ask your healthcare professional. [address] any warranty or liability for your use of this information.

## 2019-10-05 PROBLEM — E66.3 OVERWEIGHT (BMI 25.0-29.9): Status: ACTIVE | Noted: 2019-10-05

## 2019-10-18 DIAGNOSIS — F39 MOOD DISORDER (HCC): ICD-10-CM

## 2019-11-12 ENCOUNTER — TELEPHONE (OUTPATIENT)
Dept: FAMILY MEDICINE CLINIC | Age: 64
End: 2019-11-12

## 2019-11-13 RX ORDER — GEMFIBROZIL 600 MG/1
TABLET, FILM COATED ORAL
Qty: 180 TAB | Refills: 0 | Status: SHIPPED | OUTPATIENT
Start: 2019-11-13 | End: 2020-02-05

## 2020-02-05 RX ORDER — GEMFIBROZIL 600 MG/1
TABLET, FILM COATED ORAL
Qty: 180 TAB | Refills: 0 | Status: SHIPPED | OUTPATIENT
Start: 2020-02-05 | End: 2020-04-28

## 2020-03-12 RX ORDER — EZETIMIBE 10 MG/1
TABLET ORAL
Qty: 90 TAB | Refills: 1 | Status: SHIPPED | OUTPATIENT
Start: 2020-03-12 | End: 2020-08-06 | Stop reason: SDUPTHER

## 2020-03-24 NOTE — PROGRESS NOTES
Chief Complaint Patient presents with  Follow-up Mood D/O  
 
1. Have you been to the ER, urgent care clinic since your last visit? Hospitalized since your last visit? No 
 
2. Have you seen or consulted any other health care providers outside of the [address] [place] [address] since your last visit? Include any pap smears or colon screening. No  
 
HPI La Stahl comes in for follow-up care. Patient has history of anxiety. She is on Zoloft. Doing well on the medication. I will have her continue with this and will follow-up in 3 months. She feels less anxious and there has been able to do activities of daily living. She does feel her quality of life has improved. Patient also has dyslipidemia. She is on Zetia. Unable to tolerate statins. She will do lifestyle and dietary modification. She should cut down on poly saturated fats in her diet. I will check her hepatitis C status today. Past Medical History History reviewed. No pertinent past medical history. Surgical History Past Surgical History:  
Procedure Laterality Date  HX HYSTERECTOMY  2009 Medications Current Outpatient Medications Medication Sig Dispense Refill  sertraline (ZOLOFT) 25 mg tablet Take 1 Tab by mouth daily. 90 Tab 0  
 ezetimibe (ZETIA) 10 mg tablet Take 1 Tab by mouth daily. 30 Tab 3  
 valACYclovir (VALTREX) 500 mg tablet Take  by mouth daily.  alendronate (FOSAMAX) 35 mg tablet Take  by mouth every seven (7) days.  fish oil-omega-3 fatty acids 340-1,000 mg capsule Take 2 Caps by mouth daily.  glucosamine/chondr govea A sod (GLUCOSAMINE-CHONDROITIN) 750-600 mg tab Take 2 Tabs by mouth daily.  cholecalciferol (VITAMIN D3) 1,000 unit cap Take  by mouth daily. Allergies Allergies Allergen Reactions  Pcn [Penicillins] Unknown (comments) Family History Family History Problem Relation Age of Onset  Cancer Mother  Glaucoma Mother  Dementia Mother  Heart Disease Father  Cancer Maternal Grandmother Social History Social History Socioeconomic History  Marital status:  Spouse name: Not on file  Number of children: Not on file  Years of education: Not on file  Highest education level: Not on file Social Needs  Financial resource strain: Not on file  Food insecurity - worry: Not on file  Food insecurity - inability: Not on file  Transportation needs - medical: Not on file  Transportation needs - non-medical: Not on file Occupational History  Not on file Tobacco Use  Smoking status: Light Tobacco Smoker  Smokeless tobacco: Never Used Substance and Sexual Activity  Alcohol use: Yes Comment: social  
 Drug use: No  
 Sexual activity: Not on file Other Topics Concern  Not on file Social History Narrative  Not on file Review of Systems Review of Systems -10 point review of systems negative except as noted above in the HPI. Vital Signs Visit Vitals /73 (BP 1 Location: Left arm, BP Patient Position: Sitting) Pulse 84 Temp 96.7 °F (35.9 °C) (Oral) Resp 18 Ht 5' 4\" (1.626 m) Wt 169 lb (76.7 kg) SpO2 98% BMI 29.01 kg/m² Physical Exam 
Physical Examination: General appearance - alert, well appearing, and in no distress, oriented to person, place, and time and acyanotic, in no respiratory distress Mental status - alert, oriented to person, place, and time, affect appropriate to mood Lymphatics - no palpable lymphadenopathy Chest - clear to auscultation, no wheezes, rales or rhonchi, symmetric air entry Heart - normal rate, regular rhythm, normal S1, S2, no murmurs, rubs, clicks or gallops Neurological - motor and sensory grossly normal bilaterally Musculoskeletal - no joint tenderness, deformity or swelling Results Results for orders placed or performed during the hospital encounter of 12/10/18 CBC WITH AUTOMATED DIFF Result Value Ref Range WBC 10.0 4.6 - 13.2 K/uL  
 RBC 4.78 4.20 - 5.30 M/uL  
 HGB 14.5 12.0 - 16.0 g/dL HCT 45.3 (H) 35.0 - 45.0 % MCV 94.8 74.0 - 97.0 FL  
 MCH 30.3 24.0 - 34.0 PG  
 MCHC 32.0 31.0 - 37.0 g/dL  
 RDW 13.3 11.6 - 14.5 % PLATELET 886 498 - 774 K/uL MPV 12.5 (H) 9.2 - 11.8 FL  
 NEUTROPHILS 62 40 - 73 % LYMPHOCYTES 31 21 - 52 % MONOCYTES 5 3 - 10 % EOSINOPHILS 2 0 - 5 % BASOPHILS 0 0 - 2 %  
 ABS. NEUTROPHILS 6.3 1.8 - 8.0 K/UL  
 ABS. LYMPHOCYTES 3.1 0.9 - 3.6 K/UL  
 ABS. MONOCYTES 0.5 0.05 - 1.2 K/UL  
 ABS. EOSINOPHILS 0.2 0.0 - 0.4 K/UL  
 ABS. BASOPHILS 0.0 0.0 - 0.1 K/UL  
 DF AUTOMATED METABOLIC PANEL, COMPREHENSIVE Result Value Ref Range Sodium 141 136 - 145 mmol/L Potassium 4.4 3.5 - 5.5 mmol/L Chloride 108 100 - 108 mmol/L  
 CO2 26 21 - 32 mmol/L Anion gap 7 3.0 - 18 mmol/L Glucose 86 74 - 99 mg/dL BUN 13 7.0 - 18 MG/DL Creatinine 1.09 0.6 - 1.3 MG/DL  
 BUN/Creatinine ratio 12 12 - 20 GFR est AA >60 >60 ml/min/1.73m2 GFR est non-AA 51 (L) >60 ml/min/1.73m2 Calcium 9.3 8.5 - 10.1 MG/DL Bilirubin, total 0.7 0.2 - 1.0 MG/DL  
 ALT (SGPT) 56 13 - 56 U/L  
 AST (SGOT) 24 15 - 37 U/L Alk. phosphatase 89 45 - 117 U/L Protein, total 7.3 6.4 - 8.2 g/dL Albumin 4.0 3.4 - 5.0 g/dL Globulin 3.3 2.0 - 4.0 g/dL A-G Ratio 1.2 0.8 - 1.7 LIPID PANEL Result Value Ref Range LIPID PROFILE Cholesterol, total 223 (H) <200 MG/DL Triglyceride 580 (H) <150 MG/DL  
 HDL Cholesterol 35 (L) 40 - 60 MG/DL  
 LDL, calculated  0 - 100 MG/DL LDL AND VLDL CHOLESTEROL NOT CALCULATED WHEN TRIGLYCERIDES >400 MG/DL OR HDL CHOLESTEROL <20 MG/DL VLDL, calculated  MG/DL Calculation not valid with this patient's other Lipid values. CHOL/HDL Ratio 6.4 (H) 0 - 5.0 TSH 3RD GENERATION Result Value Ref Range  TSH 1.20 0.36 - 3.74 uIU/mL  
 
 
ASSESSMENT and PLAN 
 ICD-10-CM ICD-9-CM 1. Mood disorder (HCC) F39 296.90   
2. Need for hepatitis C screening test Z11.59 V73.89 HEPATITIS C AB 3. Dyslipidemia E78.5 272.4   
 
lab results and schedule of future lab studies reviewed with patient 
reviewed diet, exercise and weight control 
reviewed medications and side effects in detail I have discussed the diagnosis with the patient and the intended plan of care as seen in the above orders. The patient has received an after-visit summary and questions were answered concerning future plans. I have discussed medication, side effects, and warnings with the patient in detail. The patient verbalized understanding and is in agreement with the plan of care. The patient will contact the office with any additional concerns.  
 
Karlie Calderon MD 
 
 
 
 
 Normal vision: sees adequately in most situations; can see medication labels, newsprint

## 2020-04-28 RX ORDER — GEMFIBROZIL 600 MG/1
TABLET, FILM COATED ORAL
Qty: 180 TAB | Refills: 0 | Status: SHIPPED | OUTPATIENT
Start: 2020-04-28 | End: 2020-07-20

## 2020-05-06 ENCOUNTER — VIRTUAL VISIT (OUTPATIENT)
Dept: FAMILY MEDICINE CLINIC | Age: 65
End: 2020-05-06

## 2020-05-06 DIAGNOSIS — F39 MOOD DISORDER (HCC): Primary | ICD-10-CM

## 2020-05-06 DIAGNOSIS — E78.5 DYSLIPIDEMIA: ICD-10-CM

## 2020-05-06 DIAGNOSIS — M81.0 OSTEOPOROSIS, UNSPECIFIED OSTEOPOROSIS TYPE, UNSPECIFIED PATHOLOGICAL FRACTURE PRESENCE: ICD-10-CM

## 2020-05-06 DIAGNOSIS — E66.3 OVERWEIGHT (BMI 25.0-29.9): ICD-10-CM

## 2020-05-06 NOTE — PROGRESS NOTES
Kathi Ferro is a 62 y.o. female who was seen by synchronous (real-time) audio-video technology on 5/6/2020. Consent: Kathi Ferro, who was seen by synchronous (real-time) audio-video technology, and/or her healthcare decision maker, is aware that this patient-initiated, Telehealth encounter on 5/6/2020 is a billable service, with coverage as determined by her insurance carrier. She is aware that she may receive a bill and has provided verbal consent to proceed: Yes. Assessment & Plan:       ICD-10-CM ICD-9-CM    1. Mood disorder (HCC) F39 296.90    2. Dyslipidemia E78.5 272.4    3. Osteoporosis, unspecified osteoporosis type, unspecified pathological fracture presence M81.0 733.00    4. Overweight (BMI 25.0-29. 9) E66.3 278.02      Subjective:   Kathi Ferro is a 62 y.o. female who was seen for Depression and Cholesterol Problem    Patient has a history of depression. She is on Zoloft 25 mg daily. Has been stable on this medication. Denies any depression feelings. She is stable. She denies suicidal ideation. We will continue with the current treatment plan. Patient has a history of osteoporosis and is on Fosamax weekly. Her last bone density scan was stable. She has dyslipidemia. She is on Zetia and gemfibrozil. Tolerating these medications. We will plan to recheck labs at next visit. She has a BMI of 29.18. She will intensify lifestyle and dietary modification. Prior to Admission medications    Medication Sig Start Date End Date Taking? Authorizing Provider   gemfibroziL (LOPID) 600 mg tablet TAKE 1 TABLET BY MOUTH TWICE A DAY 4/28/20   Riley Sewell MD   ezetimibe (ZETIA) 10 mg tablet TAKE 1 TABLET BY MOUTH EVERY DAY 3/12/20   Riley Pham MD   sertraline (ZOLOFT) 25 mg tablet TAKE 1 TABLET BY MOUTH EVERY DAY 10/3/19   Riley Sewell MD   valACYclovir (VALTREX) 500 mg tablet Take  by mouth daily.     Provider, Historical   alendronate (FOSAMAX) 35 mg tablet Take by mouth every seven (7) days. Provider, Historical   fish oil-omega-3 fatty acids 340-1,000 mg capsule Take 2 Caps by mouth daily. Provider, Historical   glucosamine/chondr govea A sod (GLUCOSAMINE-CHONDROITIN) 750-600 mg tab Take 2 Tabs by mouth daily. Provider, Historical   cholecalciferol (VITAMIN D3) 1,000 unit cap Take  by mouth daily. Provider, Historical     Allergies   Allergen Reactions    Pcn [Penicillins] Unknown (comments)       ROS Review of all systems is negative except as noted above in the HPI. Objective:   Vital Signs: (As obtained by patient/caregiver at home)  There were no vitals taken for this visit. Constitutional: [x] Appears well-developed and well-nourished [x] No apparent distress  Mental status: [x] Alert and awake  [x] Oriented to person/place/time [x] Able to follow commands         HENT: [x] Normocephalic, atraumatic   [x] Mouth/Throat: Mucous membranes are moist      Pulmonary/Chest: [x] Respiratory effort normal   [x] No visualized signs of difficulty breathing or respiratory distress          Neurological:        [x] No Facial Asymmetry (Cranial nerve 7 motor function) (limited exam due to video visit)          [x] No gaze palsy                  Psychiatric:       [x] Normal Affect       [x] No Hallucinations    We discussed the expected course, resolution and complications of the diagnosis(es) in detail. Medication risks, benefits, costs, interactions, and alternatives were discussed as indicated. I advised her to contact the office if her condition worsens, changes or fails to improve as anticipated. She expressed understanding with the diagnosis(es) and plan. Keyla Cuba is a 62 y.o. female who was evaluated by a video visit encounter for concerns as above. Patient identification was verified prior to start of the visit. A caregiver was present when appropriate.  Due to this being a TeleHealth encounter (During AOIWG-09 public health emergency), evaluation of the following organ systems was limited: Vitals/Constitutional/EENT/Resp/CV/GI//MS/Neuro/Skin/Heme-Lymph-Imm. Pursuant to the emergency declaration under the [address] [place] [address], [facility] waiver authority and the [employer] Act, this Virtual  Visit was conducted, with patient's (and/or legal guardian's) consent, to reduce the patient's risk of exposure to COVID-19 and provide necessary medical care. Services were provided through a video synchronous discussion virtually to substitute for in-person clinic visit. Patient and provider were located at their individual homes.       Radha Hope MD

## 2020-07-20 RX ORDER — GEMFIBROZIL 600 MG/1
TABLET, FILM COATED ORAL
Qty: 180 TAB | Refills: 0 | Status: SHIPPED | OUTPATIENT
Start: 2020-07-20 | End: 2020-09-30

## 2020-08-06 ENCOUNTER — OFFICE VISIT (OUTPATIENT)
Dept: FAMILY MEDICINE CLINIC | Age: 65
End: 2020-08-06

## 2020-08-06 VITALS
OXYGEN SATURATION: 99 % | HEART RATE: 79 BPM | DIASTOLIC BLOOD PRESSURE: 79 MMHG | SYSTOLIC BLOOD PRESSURE: 133 MMHG | WEIGHT: 172.4 LBS | RESPIRATION RATE: 16 BRPM | BODY MASS INDEX: 29.43 KG/M2 | TEMPERATURE: 97.7 F | HEIGHT: 64 IN

## 2020-08-06 DIAGNOSIS — L82.1 SK (SEBORRHEIC KERATOSIS): ICD-10-CM

## 2020-08-06 DIAGNOSIS — F39 MOOD DISORDER (HCC): Primary | ICD-10-CM

## 2020-08-06 DIAGNOSIS — E66.3 OVERWEIGHT: ICD-10-CM

## 2020-08-06 DIAGNOSIS — E78.5 DYSLIPIDEMIA: ICD-10-CM

## 2020-08-06 RX ORDER — EZETIMIBE 10 MG/1
TABLET ORAL
Qty: 90 TAB | Refills: 2 | Status: SHIPPED | OUTPATIENT
Start: 2020-08-06 | End: 2021-06-18 | Stop reason: SDUPTHER

## 2020-08-06 RX ORDER — SERTRALINE HYDROCHLORIDE 25 MG/1
TABLET, FILM COATED ORAL
Qty: 90 TAB | Refills: 2 | Status: SHIPPED | OUTPATIENT
Start: 2020-08-06 | End: 2020-10-11

## 2020-08-06 NOTE — PROGRESS NOTES
Venipuncture to patient left forearm at this time. Patient tolerated well. No other concerns noted at this time. Patient tolerated well

## 2020-08-06 NOTE — PROGRESS NOTES
Chief Complaint   Patient presents with    Follow Up Chronic Condition     1. Have you been to the ER, urgent care clinic since your last visit? Hospitalized since your last visit? No    2. Have you seen or consulted any other health care providers outside of the [address] [place] [address] since your last visit? Include any pap smears or colon screening. No     HPI  Hans Hwang comes in for follow-up care. Mood disorder: Patient has mood disorder. She is on Zoloft 25 mg daily. Has been stable on the medication. She would like a refill of medication. Prescription will be sent in. She is not suicidal.  Dyslipidemia: Patient has dyslipidemia. She takes Zetia and Lopid. We will recheck lipid panel today. She does exercise and take a diet low in polysaturated fats. Overweight: Patient has a BMI of 29.59. She will intensify lifestyle and dietary modification. Skin lesion: Patient has a several keratosis lesion left thigh. She has noticed this over the past few days. It does irritate as it keeps rubbing on her clothing. She has an appointment to see the dermatologist and she will have them look at this and possibly get it frozen off. Health maintenance: Patient states that she had a bone density scan and mammogram done. We will request the reports. Past Medical History  No past medical history on file. Surgical History  Past Surgical History:   Procedure Laterality Date    HX HYSTERECTOMY  2009        Medications  Current Outpatient Medications   Medication Sig Dispense Refill    ezetimibe (ZETIA) 10 mg tablet TAKE 1 TABLET BY MOUTH EVERY DAY 90 Tab 2    sertraline (ZOLOFT) 25 mg tablet TAKE 1 TABLET BY MOUTH EVERY DAY 90 Tab 2    gemfibroziL (LOPID) 600 mg tablet TAKE 1 TABLET BY MOUTH TWICE A  Tab 0    valACYclovir (VALTREX) 500 mg tablet Take  by mouth daily.  alendronate (FOSAMAX) 35 mg tablet Take  by mouth every seven (7) days.       fish oil-omega-3 fatty acids 340-1,000 mg capsule Take 2 Caps by mouth daily.  glucosamine/chondr govea A sod (GLUCOSAMINE-CHONDROITIN) 750-600 mg tab Take 2 Tabs by mouth daily.  cholecalciferol (VITAMIN D3) 1,000 unit cap Take  by mouth daily. Allergies  Allergies   Allergen Reactions    Pcn [Penicillins] Unknown (comments)       Family History  Family History   Problem Relation Age of Onset    Cancer Mother     Glaucoma Mother     Dementia Mother     Heart Disease Father     Cancer Maternal Grandmother        Social History  Social History     Socioeconomic History    Marital status:      Spouse name: Not on file    Number of children: Not on file    Years of education: Not on file    Highest education level: Not on file   Occupational History    Not on file   Social Needs    Financial resource strain: Not on file    Food insecurity     Worry: Not on file     Inability: Not on file    Transportation needs     Medical: Not on file     Non-medical: Not on file   Tobacco Use    Smoking status: Current Some Day Smoker     Types: Cigars    Smokeless tobacco: Never Used   Substance and Sexual Activity    Alcohol use:  Yes     Alcohol/week: 1.0 standard drinks     Types: 1 Shots of liquor per week     Comment: social    Drug use: No    Sexual activity: Not on file   Lifestyle    Physical activity     Days per week: Not on file     Minutes per session: Not on file    Stress: Not on file   Relationships    Social connections     Talks on phone: Not on file     Gets together: Not on file     Attends Holiness service: Not on file     Active member of club or organization: Not on file     Attends meetings of clubs or organizations: Not on file     Relationship status: Not on file    Intimate partner violence     Fear of current or ex partner: Not on file     Emotionally abused: Not on file     Physically abused: Not on file     Forced sexual activity: Not on file   Other Topics Concern    Not on file   Social History Narrative    Not on file     Review of Systems  Review of Systems - Review of all systems is negative except as noted above in the HPI. Vital Signs  Visit Vitals  /79 (BP 1 Location: Left arm, BP Patient Position: Sitting)   Pulse 79   Temp 97.7 °F (36.5 °C) (Oral)   Resp 16   Ht 5' 4\" (1.626 m)   Wt 172 lb 6.4 oz (78.2 kg)   SpO2 99%   BMI 29.59 kg/m²         Physical Exam  Physical Examination: General appearance - alert, well appearing, and in no distress, oriented to person, place, and time and acyanotic, in no respiratory distress  Mental status - alert, oriented to person, place, and time  Chest - clear to auscultation, no wheezes, rales or rhonchi, symmetric air entry  Heart - normal rate and regular rhythm, S1 and S2 normal  Abdomen - no rebound tenderness noted  Back exam - limited range of motion  Neurological - alert, oriented, normal speech, no focal findings or movement disorder noted  Musculoskeletal - no muscular tenderness noted  Extremities - no pedal edema noted, intact peripheral pulses  Skin -seborrheic keratosis lesion left thigh. Results  Results for orders placed or performed during the hospital encounter of 04/08/19   LIPID PANEL   Result Value Ref Range    LIPID PROFILE          Cholesterol, total 197 <200 MG/DL    Triglyceride 420 (H) <150 MG/DL    HDL Cholesterol 36 (L) 40 - 60 MG/DL    LDL, calculated  0 - 100 MG/DL     LDL AND VLDL CHOLESTEROL NOT CALCULATED WHEN TRIGLYCERIDES >400 MG/DL OR HDL CHOLESTEROL <20 MG/DL    VLDL, calculated  MG/DL     Calculation not valid with this patient's other Lipid values. CHOL/HDL Ratio 5.5 (H) 0 - 5.0     HEPATIC FUNCTION PANEL   Result Value Ref Range    Protein, total 7.0 6.4 - 8.2 g/dL    Albumin 4.2 3.4 - 5.0 g/dL    Globulin 2.8 2.0 - 4.0 g/dL    A-G Ratio 1.5 0.8 - 1.7      Bilirubin, total 0.8 0.2 - 1.0 MG/DL    Bilirubin, direct 0.1 0.0 - 0.2 MG/DL    Alk.  phosphatase 91 45 - 117 U/L    AST (SGOT) 29 15 - 37 U/L    ALT (SGPT) 47 13 - 56 U/L       ASSESSMENT and PLAN    ICD-10-CM ICD-9-CM    1. Mood disorder (HCC)  F39 296.90 sertraline (ZOLOFT) 25 mg tablet   2. Dyslipidemia  E78.5 272.4 LIPID PANEL      CBC WITH AUTOMATED DIFF      METABOLIC PANEL, COMPREHENSIVE   3. SK (seborrheic keratosis)  L82.1 702.19    4. Overweight  E66.3 278.02      lab results and schedule of future lab studies reviewed with patient  reviewed diet, exercise and weight control  cardiovascular risk and specific lipid/LDL goals reviewed  reviewed medications and side effects in detail      I have discussed the diagnosis with the patient and the intended plan of care as seen in the above orders. The patient has received an after-visit summary and questions were answered concerning future plans. I have discussed medication, side effects, and warnings with the patient in detail. The patient verbalized understanding and is in agreement with the plan of care. The patient will contact the office with any additional concerns. Ninoska Sunshine MD    PLEASE NOTE:   This document has been produced using voice recognition software.  Unrecognized errors in transcription may be present

## 2020-08-25 DIAGNOSIS — E78.5 DYSLIPIDEMIA: ICD-10-CM

## 2020-09-30 RX ORDER — GEMFIBROZIL 600 MG/1
TABLET, FILM COATED ORAL
Qty: 180 TAB | Refills: 0 | Status: SHIPPED | OUTPATIENT
Start: 2020-09-30 | End: 2020-12-23

## 2020-10-08 DIAGNOSIS — F39 MOOD DISORDER (HCC): ICD-10-CM

## 2020-10-11 RX ORDER — SERTRALINE HYDROCHLORIDE 25 MG/1
TABLET, FILM COATED ORAL
Qty: 90 TAB | Refills: 2 | Status: SHIPPED | OUTPATIENT
Start: 2020-10-11 | End: 2021-06-20

## 2020-12-23 RX ORDER — GEMFIBROZIL 600 MG/1
TABLET, FILM COATED ORAL
Qty: 180 TAB | Refills: 0 | Status: SHIPPED | OUTPATIENT
Start: 2020-12-23 | End: 2021-03-04 | Stop reason: SDUPTHER

## 2021-02-17 ENCOUNTER — TRANSCRIBE ORDER (OUTPATIENT)
Dept: SCHEDULING | Age: 66
End: 2021-02-17

## 2021-02-17 DIAGNOSIS — Z12.31 VISIT FOR SCREENING MAMMOGRAM: Primary | ICD-10-CM

## 2021-02-18 ENCOUNTER — OFFICE VISIT (OUTPATIENT)
Dept: FAMILY MEDICINE CLINIC | Age: 66
End: 2021-02-18
Payer: COMMERCIAL

## 2021-02-18 VITALS
OXYGEN SATURATION: 99 % | BODY MASS INDEX: 29.02 KG/M2 | DIASTOLIC BLOOD PRESSURE: 68 MMHG | WEIGHT: 170 LBS | HEART RATE: 83 BPM | RESPIRATION RATE: 16 BRPM | HEIGHT: 64 IN | SYSTOLIC BLOOD PRESSURE: 116 MMHG | TEMPERATURE: 97.3 F

## 2021-02-18 DIAGNOSIS — F39 MOOD DISORDER (HCC): Primary | ICD-10-CM

## 2021-02-18 DIAGNOSIS — E66.3 OVERWEIGHT (BMI 25.0-29.9): ICD-10-CM

## 2021-02-18 DIAGNOSIS — E78.5 DYSLIPIDEMIA: ICD-10-CM

## 2021-02-18 DIAGNOSIS — U07.1 COVID-19: ICD-10-CM

## 2021-02-18 PROCEDURE — 99213 OFFICE O/P EST LOW 20 MIN: CPT | Performed by: FAMILY MEDICINE

## 2021-02-18 NOTE — PROGRESS NOTES
Chief Complaint   Patient presents with    Follow Up Chronic Condition     1. Have you been to the ER, urgent care clinic since your last visit? Hospitalized since your last visit? No    2. Have you seen or consulted any other health care providers outside of the [address] [place] [address] since your last visit? Include any pap smears or colon screening. No     hospitals  Norma Copping comes in for follow-up care. Mood disorder: Patient has a history of mood disorder. She is on Zoloft 25 mg daily. She has been stable on this medication. Patient states that she is retired recently. Stress has diminished significantly. States that she thinks she may eventually get off the medication. We talked about gradually weaning her off medication if needed. COVID-19 infection: Patient had COVID-19 infection a few weeks ago. She has since recovered from this. Most recent test for COVID-19 done 3 days ago was negative. She denies fever, chills, joint aches, shortness of breath or cough. Continue supportive care. Dyslipidemia: Patient has dyslipidemia. She is on gemfibrozil and Zetia. We will recheck labs today. Overweight: Patient has a BMI of 39.18. She will intensify lifestyle and dietary modification. Past Medical History  No past medical history on file. Surgical History  Past Surgical History:   Procedure Laterality Date    HX HYSTERECTOMY  2009        Medications  Current Outpatient Medications   Medication Sig Dispense Refill    gemfibroziL (LOPID) 600 mg tablet TAKE 1 TABLET BY MOUTH TWICE A  Tab 0    sertraline (ZOLOFT) 25 mg tablet TAKE 1 TABLET BY MOUTH EVERY DAY 90 Tab 2    ezetimibe (ZETIA) 10 mg tablet TAKE 1 TABLET BY MOUTH EVERY DAY 90 Tab 2    valACYclovir (VALTREX) 500 mg tablet Take  by mouth daily.  alendronate (FOSAMAX) 35 mg tablet Take  by mouth every seven (7) days.  fish oil-omega-3 fatty acids 340-1,000 mg capsule Take 2 Caps by mouth daily.       glucosamine/chondr govea A sod (GLUCOSAMINE-CHONDROITIN) 750-600 mg tab Take 2 Tabs by mouth daily.  cholecalciferol (VITAMIN D3) 1,000 unit cap Take  by mouth daily. Allergies  Allergies   Allergen Reactions    Pcn [Penicillins] Unknown (comments)       Family History  Family History   Problem Relation Age of Onset    Cancer Mother     Glaucoma Mother     Dementia Mother     Heart Disease Father     Cancer Maternal Grandmother        Social History  Social History     Socioeconomic History    Marital status:      Spouse name: Not on file    Number of children: Not on file    Years of education: Not on file    Highest education level: Not on file   Occupational History    Not on file   Social Needs    Financial resource strain: Not on file    Food insecurity     Worry: Not on file     Inability: Not on file    Transportation needs     Medical: Not on file     Non-medical: Not on file   Tobacco Use    Smoking status: Current Some Day Smoker     Types: Cigars    Smokeless tobacco: Never Used   Substance and Sexual Activity    Alcohol use:  Yes     Alcohol/week: 1.0 standard drinks     Types: 1 Shots of liquor per week     Comment: social    Drug use: No    Sexual activity: Not on file   Lifestyle    Physical activity     Days per week: Not on file     Minutes per session: Not on file    Stress: Not on file   Relationships    Social connections     Talks on phone: Not on file     Gets together: Not on file     Attends Mormon service: Not on file     Active member of club or organization: Not on file     Attends meetings of clubs or organizations: Not on file     Relationship status: Not on file    Intimate partner violence     Fear of current or ex partner: Not on file     Emotionally abused: Not on file     Physically abused: Not on file     Forced sexual activity: Not on file   Other Topics Concern    Not on file   Social History Narrative    Not on file       Review of Systems  Review of Systems - Review of all systems is negative except as noted above in the HPI. Vital Signs  Visit Vitals  /68 (BP 1 Location: Left upper arm, BP Patient Position: Sitting, BP Cuff Size: Adult)   Pulse 83   Temp 97.3 °F (36.3 °C) (Temporal)   Resp 16   Ht 5' 4\" (1.626 m)   Wt 170 lb (77.1 kg)   SpO2 99%   BMI 29.18 kg/m²         Physical Exam  Physical Examination: General appearance - alert, well appearing, and in no distress, oriented to person, place, and time, overweight, acyanotic, in no respiratory distress and well hydrated  Mental status - alert, oriented to person, place, and time, affect appropriate to mood  Chest - clear to auscultation, no wheezes, rales or rhonchi, symmetric air entry  Heart - normal rate and regular rhythm, S1 and S2 normal  Abdomen - no rebound tenderness noted  Back exam - limited range of motion  Neurological - motor and sensory grossly normal bilaterally  Musculoskeletal - no muscular tenderness noted  Extremities - intact peripheral pulses      Results  Results for orders placed or performed during the hospital encounter of 04/08/19   LIPID PANEL   Result Value Ref Range    LIPID PROFILE          Cholesterol, total 197 <200 MG/DL    Triglyceride 420 (H) <150 MG/DL    HDL Cholesterol 36 (L) 40 - 60 MG/DL    LDL, calculated  0 - 100 MG/DL     LDL AND VLDL CHOLESTEROL NOT CALCULATED WHEN TRIGLYCERIDES >400 MG/DL OR HDL CHOLESTEROL <20 MG/DL    VLDL, calculated  MG/DL     Calculation not valid with this patient's other Lipid values. CHOL/HDL Ratio 5.5 (H) 0 - 5.0     HEPATIC FUNCTION PANEL   Result Value Ref Range    Protein, total 7.0 6.4 - 8.2 g/dL    Albumin 4.2 3.4 - 5.0 g/dL    Globulin 2.8 2.0 - 4.0 g/dL    A-G Ratio 1.5 0.8 - 1.7      Bilirubin, total 0.8 0.2 - 1.0 MG/DL    Bilirubin, direct 0.1 0.0 - 0.2 MG/DL    Alk.  phosphatase 91 45 - 117 U/L    AST (SGOT) 29 15 - 37 U/L    ALT (SGPT) 47 13 - 56 U/L       ASSESSMENT and PLAN    ICD-10-CM ICD-9-CM    1. Mood disorder (HCC)  F39 296.90    2. Dyslipidemia  E78.5 272.4 LIPID PANEL      METABOLIC PANEL, COMPREHENSIVE      CBC WITH AUTOMATED DIFF   3. Overweight (BMI 25.0-29. 9)  E66.3 278.02    4. COVID-19  U07.1 079.89      lab results and schedule of future lab studies reviewed with patient  reviewed diet, exercise and weight control  cardiovascular risk and specific lipid/LDL goals reviewed  reviewed medications and side effects in detail      I have discussed the diagnosis with the patient and the intended plan of care as seen in the above orders. The patient has received an after-visit summary and questions were answered concerning future plans. I have discussed medication, side effects, and warnings with the patient in detail. The patient verbalized understanding and is in agreement with the plan of care. The patient will contact the office with any additional concerns. Marely Sprague MD    PLEASE NOTE:   This document has been produced using voice recognition software.  Unrecognized errors in transcription may be present

## 2021-02-22 ENCOUNTER — HOSPITAL ENCOUNTER (OUTPATIENT)
Dept: MAMMOGRAPHY | Age: 66
Discharge: HOME | End: 2021-02-22
Attending: OBSTETRICS & GYNECOLOGY
Payer: COMMERCIAL

## 2021-02-22 ENCOUNTER — HOSPITAL ENCOUNTER (OUTPATIENT)
Dept: LAB | Age: 66
Discharge: HOME | End: 2021-02-22
Attending: OBSTETRICS & GYNECOLOGY

## 2021-02-22 DIAGNOSIS — E78.5 DYSLIPIDEMIA: ICD-10-CM

## 2021-02-22 DIAGNOSIS — Z12.31 VISIT FOR SCREENING MAMMOGRAM: ICD-10-CM

## 2021-02-22 PROCEDURE — 77067 SCR MAMMO BI INCL CAD: CPT

## 2021-02-23 LAB
CREATININE, EXTERNAL: 1.2
LDL-C, EXTERNAL: 125

## 2021-03-03 RX ORDER — EZETIMIBE 10 MG/1
TABLET ORAL
Qty: 90 TAB | Refills: 2 | OUTPATIENT
Start: 2021-03-03

## 2021-03-04 NOTE — TELEPHONE ENCOUNTER
Requested Prescriptions     Pending Prescriptions Disp Refills    gemfibroziL (LOPID) 600 mg tablet 180 Tab 0     Refused Prescriptions Disp Refills    ezetimibe (ZETIA) 10 mg tablet [Pharmacy Med Name: EZETIMIBE 10 MG TABLET] 90 Tab 2     Sig: TAKE 1 TABLET BY MOUTH EVERY DAY     Refused By: Dorethea Epley     Reason for Refusal: other     Juany Raderer called for their medication refill.     Last Office visit:  02-  Last labs:  08-  Follow up visit:  08-  Last date prescribe 12-, 08-    Please Advise

## 2021-03-04 NOTE — TELEPHONE ENCOUNTER
Requested Prescriptions     Pending Prescriptions Disp Refills    gemfibroziL (LOPID) 600 mg tablet 180 Tab 0     Refused Prescriptions Disp Refills    ezetimibe (ZETIA) 10 mg tablet [Pharmacy Med Name: EZETIMIBE 10 MG TABLET] 90 Tab 2     Sig: TAKE 1 TABLET BY MOUTH EVERY DAY     Refused By: Luis Ramirez     Reason for Refusal: other

## 2021-03-08 RX ORDER — GEMFIBROZIL 600 MG/1
TABLET, FILM COATED ORAL
Qty: 180 TAB | Refills: 0 | Status: SHIPPED | OUTPATIENT
Start: 2021-03-08 | End: 2021-04-12

## 2021-03-12 ENCOUNTER — TELEPHONE (OUTPATIENT)
Dept: FAMILY MEDICINE CLINIC | Age: 66
End: 2021-03-12

## 2021-03-12 NOTE — TELEPHONE ENCOUNTER
Spoke with patient. Patient was informed that [facility] [address] stated that they never received a lab collection from patient. Patient stated she was going to call lab. Patient didn't have any other questions or concerns.

## 2021-03-12 NOTE — TELEPHONE ENCOUNTER
Pt is requesting her lab results.  She had labs done at [facility]. Please contact pt when available stated she had labs done in Feb of this year

## 2021-03-16 ENCOUNTER — TELEPHONE (OUTPATIENT)
Dept: FAMILY MEDICINE CLINIC | Age: 66
End: 2021-03-16

## 2021-03-16 NOTE — TELEPHONE ENCOUNTER
Pt is requesting to be contacted when available. Ms Araseli Ash is waiting on her lab results.  Please contact this pt when available

## 2021-03-19 NOTE — TELEPHONE ENCOUNTER
Attempted to contact patient. Patient didn't answer. Patient was left a voice message to call the office back.

## 2021-03-31 ENCOUNTER — TELEPHONE (OUTPATIENT)
Dept: FAMILY MEDICINE CLINIC | Age: 66
End: 2021-03-31

## 2021-03-31 NOTE — TELEPHONE ENCOUNTER
Pt is requesting to be contact by Marlon Barksdale, stated she would like to know her lab results, last time she spoke with Marlon Barksdale she was told that Dr Maricel Serrato has to view the results. Please follow up when available.  Pt would like to called on her cell phone at [phone]

## 2021-04-01 DIAGNOSIS — R79.89 ELEVATED PLATELET COUNT: Primary | ICD-10-CM

## 2021-04-02 NOTE — TELEPHONE ENCOUNTER
Spoke with patient. Patient was given given lab results. Patient didn't have any other questions or concerns.

## 2021-04-05 ENCOUNTER — TELEPHONE (OUTPATIENT)
Dept: FAMILY MEDICINE CLINIC | Age: 66
End: 2021-04-05

## 2021-04-05 NOTE — TELEPHONE ENCOUNTER
Patient called stating she received a call from the hematologist that she needed an appt and she wanted someone to explain why. She made the appt for 05/12.   Please call her back to discuss    Thanks

## 2021-04-06 NOTE — TELEPHONE ENCOUNTER
Please call medical records and have them scan/upload patient's most recent lab results from [facility] . These were done in March this year.   Ashu Nieves MD

## 2021-04-06 NOTE — TELEPHONE ENCOUNTER
I called and spoke to patient. She had done her results at [address] and we had gotten a fax of the same. These showed elevated platelet count that was not able to be read. As a result I had referred her to the hematologist for an evaluation. I have explained this to the patient. She has an appointment and she should keep it. We will ensure that her lab results are faxed to the hematologist office and are also scanned into her chart ASAP so that the hematologist will be able to review them.   Ashu Nieves MD

## 2021-04-06 NOTE — TELEPHONE ENCOUNTER
Spoke with patient. She wanted to know why she was referred to the hematologist. Please put in results from lab work.

## 2021-04-12 RX ORDER — GEMFIBROZIL 600 MG/1
TABLET, FILM COATED ORAL
Qty: 180 TAB | Refills: 0 | Status: SHIPPED | OUTPATIENT
Start: 2021-04-12 | End: 2021-08-25 | Stop reason: SDUPTHER

## 2021-05-12 ENCOUNTER — OFFICE VISIT (OUTPATIENT)
Dept: ONCOLOGY | Age: 66
End: 2021-05-12
Payer: COMMERCIAL

## 2021-05-12 VITALS
DIASTOLIC BLOOD PRESSURE: 71 MMHG | TEMPERATURE: 98.7 F | HEIGHT: 64 IN | WEIGHT: 167 LBS | RESPIRATION RATE: 16 BRPM | OXYGEN SATURATION: 99 % | BODY MASS INDEX: 28.51 KG/M2 | SYSTOLIC BLOOD PRESSURE: 119 MMHG | HEART RATE: 83 BPM

## 2021-05-12 DIAGNOSIS — D75.839 THROMBOCYTOSIS: Primary | ICD-10-CM

## 2021-05-12 PROCEDURE — 99204 OFFICE O/P NEW MOD 45 MIN: CPT | Performed by: INTERNAL MEDICINE

## 2021-05-21 LAB
% SATURATION: 19 % (CALC) (ref 16–45)
ALB/GLOBRATIO, 58C: 1.8 (CALC) (ref 1–2.5)
ALBUMIN SERPL-MCNC: 4.6 G/DL (ref 3.6–5.1)
ALKALINE PHOSPHATASE, TOTAL, 25002000: 80 U/L (ref 37–153)
ALT SERPL-CCNC: 27 U/L (ref 6–29)
ANA SCREEN, IFA: NEGATIVE
ASSAY DETAILS: NORMAL
AST SERPL W P-5'-P-CCNC: 20 U/L (ref 10–35)
BASOPHILS # BLD: 10 CELLS/UL (ref 0–200)
BASOPHILS NFR BLD: 0.1 %
BCR-ABL1/ABL1 % (IS): 0 %
BILIRUB SERPL-MCNC: 0.7 MG/DL (ref 0.2–1.2)
BLOCK/SPECIMEN ID: NORMAL
BUN SERPL-MCNC: 18 MG/DL (ref 7–25)
BUN/CREATININE RATIO,BUCR: 16 (CALC) (ref 6–22)
CALCIUM SERPL-MCNC: 10.2 MG/DL (ref 8.6–10.4)
CALR EXON 9 MUTATION: NOT DETECTED
CHLORIDE SERPL-SCNC: 106 MMOL/L (ref 98–110)
CO2 SERPL-SCNC: 23 MMOL/L (ref 20–32)
COMMENT(S): NORMAL
CREAT SERPL-MCNC: 1.14 MG/DL (ref 0.5–0.99)
EOSINOPHIL # BLD: 154 CELLS/UL (ref 15–500)
EOSINOPHIL NFR BLD: 1.6 %
ERYTHROCYTE [DISTWIDTH] IN BLOOD BY AUTOMATED COUNT: 13.6 % (ref 11–15)
ESR, WESTERGREN: 17 MM/H (ref 0–30)
FERRITIN SERPL-MCNC: 210 NG/ML (ref 16–288)
GLOBULIN,GLOB: 2.6 G/DL (CALC) (ref 1.9–3.7)
GLUCOSE SERPL-MCNC: 99 MG/DL (ref 65–99)
HCT VFR BLD AUTO: 40.9 % (ref 35–45)
HEMATOPATHOLOGY REVIEW: NORMAL
HGB BLD-MCNC: 13.5 G/DL (ref 11.7–15.5)
INTERPRETATION, 86008605: NORMAL
INTERPRETATION: NORMAL
IRON,IRN: 83 MCG/DL (ref 45–160)
JAK2 V617F MUTATION: NOT DETECTED
LYMPHOCYTES # BLD: 2890 CELLS/UL (ref 850–3900)
LYMPHOCYTES NFR BLD: 30.1 %
Lab: 0 %
Lab: NORMAL
MCH RBC QN AUTO: 31.5 PG (ref 27–33)
MCHC RBC AUTO-ENTMCNC: 33 G/DL (ref 32–36)
MCV RBC AUTO: 95.6 FL (ref 80–100)
MONOCYTES # BLD: 595 CELLS/UL (ref 200–950)
MONOCYTES NFR BLD: 6.2 %
MPL EXON 10 MUTATION: NOT DETECTED
NEUTROPHILS # BLD AUTO: 5952 CELLS/UL (ref 1500–7800)
NEUTROPHILS # BLD: 62 %
P190 BCR ABL1: NOT DETECTED
P210 BCR ABL1: NOT DETECTED
PLATELET # BLD AUTO: 344 THOUSAND/UL (ref 140–400)
PMV BLD AUTO: 11.8 FL (ref 7.5–12.5)
POTASSIUM SERPL-SCNC: 5 MMOL/L (ref 3.5–5.3)
PRIOR RESULT: NORMAL
PROT SERPL-MCNC: 7.2 G/DL (ref 6.1–8.1)
RBC # BLD AUTO: 4.28 MILLION/UL (ref 3.8–5.1)
SODIUM SERPL-SCNC: 143 MMOL/L (ref 135–146)
SOURCE, 86003313: NORMAL
SPECIMEN SOURCE, M86007404: NORMAL
SPECIMEN SOURCE, M86007404: NORMAL
TIBC SERPL-MCNC: 430 MCG/DL (CALC) (ref 250–450)
WBC # BLD AUTO: 9.6 THOUSAND/UL (ref 3.8–10.8)

## 2021-05-30 NOTE — PROGRESS NOTES
Hematology/Oncology Note      Date: 6/3/2021    Name: Liliya Diaz  : 1955        Ernst Deleon MD         Subjective:     Chief complaint: Thrombocytosis    History of Present Illness:   Ms. Sydnie Freed is a most pleasant 60y.o. year old female who was seen for consultation of thrombocytosis. The patient has an unremarkable past medical history. On 2/3/2021 she was diagnosed with COVID 19 infection. 2021 CBC reported WBC 9.5, hemoglobin 13.7, hematocrit 40.3%, and increased numbers of platelets. Her platelet count was unable to report due to significant platelet clumping reportedly. Lab reviews indicated on 12/10/2018 her platelet was 930,290. She reported that she is getting better after COVID19 infection. She also completed 2 doses of COVID vaccination recently. Since last visit she denied any complaints. Denied fever, chills, night sweat, unintentional weight loss, skin lumps or bumps, acute bleeding or bruising issues. Denied headache, acute vision change, dizziness, chest pain, worsen shortness of breath, palpitation, productive cough, nausea, vomiting, abdominal pain, altered bowel habits, dysuria, worsen bone pain or back pain, new focal numbness or weakness. Family History: Mother had breast cancers. Past Medical History, Family History, and Social History:    History reviewed. No pertinent past medical history. Past Surgical History:   Procedure Laterality Date    HX HYSTERECTOMY       Social History     Socioeconomic History    Marital status:      Spouse name: Not on file    Number of children: Not on file    Years of education: Not on file    Highest education level: Not on file   Occupational History    Not on file   Tobacco Use    Smoking status: Current Some Day Smoker     Types: Cigars    Smokeless tobacco: Never Used   Substance and Sexual Activity    Alcohol use:  Yes     Alcohol/week: 1.0 standard drinks     Types: 1 Shots of liquor per week     Comment: social    Drug use: No    Sexual activity: Not on file   Other Topics Concern    Not on file   Social History Narrative    Not on file     Social Determinants of Health     Financial Resource Strain:     Difficulty of Paying Living Expenses:    Food Insecurity:     Worried About Running Out of Food in the Last Year:     [address] in the Last Year:    Transportation Needs:     Lack of Transportation (Medical):  Lack of Transportation (Non-Medical):    Physical Activity:     Days of Exercise per Week:     Minutes of Exercise per Session:    Stress:     Feeling of Stress :    Social Connections:     Frequency of Communication with Friends and Family:     Frequency of Social Gatherings with Friends and Family:     Attends Muslim Services:     Active Member of Clubs or Organizations:     Attends Club or Organization Meetings:     Marital Status:    Intimate Partner Violence:     Fear of Current or Ex-Partner:     Emotionally Abused:     Physically Abused:     Sexually Abused:      Family History   Problem Relation Age of Onset    Cancer Mother     Glaucoma Mother     Dementia Mother     Heart Disease Father     Cancer Maternal Grandmother      Current Outpatient Medications   Medication Sig Dispense Refill    gemfibroziL (LOPID) 600 mg tablet TAKE 1 TABLET BY MOUTH TWICE A  Tab 0    sertraline (ZOLOFT) 25 mg tablet TAKE 1 TABLET BY MOUTH EVERY DAY 90 Tab 2    ezetimibe (ZETIA) 10 mg tablet TAKE 1 TABLET BY MOUTH EVERY DAY 90 Tab 2    valACYclovir (VALTREX) 500 mg tablet Take  by mouth daily.  alendronate (FOSAMAX) 35 mg tablet Take  by mouth every seven (7) days.  fish oil-omega-3 fatty acids 340-1,000 mg capsule Take 2 Caps by mouth daily.  glucosamine/chondr govea A sod (GLUCOSAMINE-CHONDROITIN) 750-600 mg tab Take 2 Tabs by mouth daily.  cholecalciferol (VITAMIN D3) 1,000 unit cap Take  by mouth daily.          Review of Systems Constitutional: Negative for chills, diaphoresis, fever, malaise/fatigue and weight loss. Respiratory: Negative for cough, hemoptysis, shortness of breath and wheezing. Cardiovascular: Negative for chest pain, palpitations and leg swelling. Gastrointestinal: Negative for abdominal pain, diarrhea, heartburn, nausea and vomiting. Genitourinary: Negative for dysuria, frequency, hematuria and urgency. Musculoskeletal: Negative for joint pain and myalgias. Skin: Negative for itching and rash. Neurological: Negative for dizziness, seizures, weakness and headaches. Psychiatric/Behavioral: Negative for depression. The patient does not have insomnia. Objective:     Visit Vitals  /74 (BP 1 Location: Right arm, BP Patient Position: Sitting, BP Cuff Size: Adult)   Pulse 78   Temp 98.4 °F (36.9 °C) (Temporal)   Resp 16   Ht 5' 4\" (1.626 m)   Wt 75.8 kg (167 lb)   SpO2 98%   BMI 28.67 kg/m²       ECOG Performance Status (grade): 0  0 - able to carry on all pre-disease activity w/out restriction  1 - restricted but able to carry out light work  2 - ambulatory and can self- care but unable to carry out work  3 - bed or chair >50% of waking hours  4 - completely disable, total care, confined to bed or chair    Physical Exam  Constitutional:       Appearance: Normal appearance. HENT:      Head: Normocephalic and atraumatic. Eyes:      Pupils: Pupils are equal, round, and reactive to light. Cardiovascular:      Rate and Rhythm: Normal rate and regular rhythm. Heart sounds: Normal heart sounds. Pulmonary:      Effort: Pulmonary effort is normal.      Breath sounds: Normal breath sounds. Abdominal:      General: Bowel sounds are normal.      Palpations: Abdomen is soft. Tenderness: There is no abdominal tenderness. There is no guarding. Musculoskeletal:         General: Normal range of motion. Cervical back: Neck supple. Right lower leg: No edema.       Left lower leg: No edema. Skin:     General: Skin is warm. Neurological:      General: No focal deficit present. Mental Status: She is alert and oriented to person, place, and time. Mental status is at baseline. Diagnostics:      No results found for this or any previous visit (from the past 96 hour(s)). Imaging:  No results found for this or any previous visit. Results for orders placed in visit on 08/25/20    XR DEXA BONE DENSITY APPENDICULAR      No results found for this or any previous visit. Pathology          Assessment:                                        1. Thrombocytosis ([facility] [address]        Plan:                                        # Thrombocytosis  -- 2/3/2021 she was diagnosed with COVID 19 infection. -- 4/17/2021 CBC reported WBC 9.5, hemoglobin 13.7, hematocrit 40.3%, and increased numbers of platelets. Her platelet count was unable to report due to significant platelet clumping reportedly. Lab reviews indicated on 12/10/2018 her platelet was [phone],963.  -- She also completed 2 doses of COVID vaccination recently. -- Today I have reviewed with the patient about recent lab reports.   + 5/12/2021 CBC reported hemoglobin 13.5, hematocrit 40.9%, WBC 9.6, platelet 373,644. Negative LISE/BCR ABL/JAK2/CALR/MPL. Thrombocytosis has resolved, likely reactive 2/2 COVID vaccination. -- The patient will continue monitor CBC with PCP. She will RTC as needed. No orders of the defined types were placed in this encounter. Ms. Allegra Mackey has a reminder for a \"due or due soon\" health maintenance. I have asked that she contact her primary care provider for follow-up on this health maintenance. All of patient's questions answered to their apparent satisfaction. They verbally show understanding and agreement with aforementioned plan.            Ladoris Phoenix, MD  6/3/2021          About 30 minutes were spent for this encounter with more than 50% of the time spent in face-to-face counseling, discussing on diagnosis and management plan going forward, and co-ordination of care. Parts of this document has been produced using Dragon dictation system. Unrecognized errors in transcription may be present. Please do not hesitate to reach out for any questions or clarifications.       CC: Una Bazan MD

## 2021-06-03 ENCOUNTER — OFFICE VISIT (OUTPATIENT)
Dept: ONCOLOGY | Age: 66
End: 2021-06-03
Payer: COMMERCIAL

## 2021-06-03 VITALS
RESPIRATION RATE: 16 BRPM | HEIGHT: 64 IN | TEMPERATURE: 98.4 F | WEIGHT: 167 LBS | DIASTOLIC BLOOD PRESSURE: 74 MMHG | BODY MASS INDEX: 28.51 KG/M2 | SYSTOLIC BLOOD PRESSURE: 123 MMHG | HEART RATE: 78 BPM | OXYGEN SATURATION: 98 %

## 2021-06-03 DIAGNOSIS — D75.839 THROMBOCYTOSIS: Primary | ICD-10-CM

## 2021-06-03 PROCEDURE — 99214 OFFICE O/P EST MOD 30 MIN: CPT | Performed by: INTERNAL MEDICINE

## 2021-06-18 DIAGNOSIS — F39 MOOD DISORDER (HCC): ICD-10-CM

## 2021-06-20 RX ORDER — SERTRALINE HYDROCHLORIDE 25 MG/1
TABLET, FILM COATED ORAL
Qty: 90 TABLET | Refills: 2 | Status: SHIPPED | OUTPATIENT
Start: 2021-06-20 | End: 2022-01-25

## 2021-06-20 RX ORDER — EZETIMIBE 10 MG/1
TABLET ORAL
Qty: 90 TABLET | Refills: 2 | Status: SHIPPED | OUTPATIENT
Start: 2021-06-20 | End: 2022-01-25

## 2021-08-25 ENCOUNTER — OFFICE VISIT (OUTPATIENT)
Dept: FAMILY MEDICINE CLINIC | Age: 66
End: 2021-08-25
Payer: COMMERCIAL

## 2021-08-25 ENCOUNTER — HOSPITAL ENCOUNTER (OUTPATIENT)
Dept: LAB | Age: 66
Discharge: HOME | End: 2021-08-25
Payer: COMMERCIAL

## 2021-08-25 VITALS
WEIGHT: 160 LBS | BODY MASS INDEX: 27.31 KG/M2 | OXYGEN SATURATION: 99 % | DIASTOLIC BLOOD PRESSURE: 67 MMHG | RESPIRATION RATE: 18 BRPM | HEIGHT: 64 IN | SYSTOLIC BLOOD PRESSURE: 123 MMHG | TEMPERATURE: 96.3 F | HEART RATE: 78 BPM

## 2021-08-25 DIAGNOSIS — E66.3 OVERWEIGHT (BMI 25.0-29.9): ICD-10-CM

## 2021-08-25 DIAGNOSIS — F39 MOOD DISORDER (HCC): ICD-10-CM

## 2021-08-25 DIAGNOSIS — N18.31 STAGE 3A CHRONIC KIDNEY DISEASE (HCC): ICD-10-CM

## 2021-08-25 DIAGNOSIS — E78.5 DYSLIPIDEMIA: Primary | ICD-10-CM

## 2021-08-25 DIAGNOSIS — E78.5 DYSLIPIDEMIA: ICD-10-CM

## 2021-08-25 LAB
ALBUMIN SERPL-MCNC: 4.3 G/DL (ref 3.5–4.7)
ALBUMIN/GLOB SERPL: 1.3 {RATIO}
ALP SERPL-CCNC: 67 U/L (ref 38–126)
ALT SERPL-CCNC: 29 U/L (ref 3–52)
ANION GAP SERPL CALC-SCNC: 12 MMOL/L
AST SERPL W P-5'-P-CCNC: 24 U/L (ref 14–74)
BILIRUB SERPL-MCNC: 0.9 MG/DL (ref 0.2–1)
BUN SERPL-MCNC: 16 MG/DL (ref 9–21)
BUN/CREAT SERPL: 16
CA-I BLD-MCNC: 10.1 MG/DL (ref 8.5–10.5)
CHLORIDE SERPL-SCNC: 105 MMOL/L (ref 94–111)
CHOLEST SERPL-MCNC: 166 MG/DL
CO2 SERPL-SCNC: 23 MMOL/L (ref 21–33)
CREAT SERPL-MCNC: 1 MG/DL (ref 0.7–1.2)
GLOBULIN SER CALC-MCNC: 3.3 G/DL
GLUCOSE SERPL-MCNC: 84 MG/DL (ref 70–110)
HDLC SERPL-MCNC: 51 MG/DL (ref 40–60)
HDLC SERPL: 3.3 {RATIO} (ref 0–5)
LDLC SERPL CALC-MCNC: 97.6 MG/DL (ref 0–100)
LIPID PROFILE,FLP: NORMAL
POTASSIUM SERPL-SCNC: 4.2 MMOL/L (ref 3.2–5.1)
PROT SERPL-MCNC: 7.6 G/DL (ref 6.1–8.4)
SODIUM SERPL-SCNC: 140 MMOL/L (ref 135–145)
TRIGL SERPL-MCNC: 87 MG/DL (ref ?–150)
VLDLC SERPL CALC-MCNC: 17.4 MG/DL

## 2021-08-25 PROCEDURE — 99213 OFFICE O/P EST LOW 20 MIN: CPT | Performed by: FAMILY MEDICINE

## 2021-08-25 PROCEDURE — 36415 COLL VENOUS BLD VENIPUNCTURE: CPT

## 2021-08-25 PROCEDURE — 80061 LIPID PANEL: CPT

## 2021-08-25 PROCEDURE — 80053 COMPREHEN METABOLIC PANEL: CPT

## 2021-08-25 RX ORDER — GEMFIBROZIL 600 MG/1
600 TABLET, FILM COATED ORAL 2 TIMES DAILY
Qty: 180 TABLET | Refills: 0 | Status: SHIPPED | OUTPATIENT
Start: 2021-08-25 | End: 2021-09-14

## 2021-08-25 NOTE — PROGRESS NOTES
HPI  Prema Potts comes in for f/u care. Dyslipidemia: Patient has dyslipidemia. She is on Zetia and gemfibrozil. Needs a refill of gemfibrozil. We will recheck lipid panel. She will continue to exercise and take a diet low in polysaturated fats. CKD Stage 3: Patient has chronic kidney disease stage III. Plan is to avoid any nephrotoxic medications. We will recheck CMP. Mood disorder: Patient has mood disorder. She takes Zoloft. Stable on medication. Continue current treatment plan. Overweight: Patient has a BMI of 37.46. She is doing lifestyle and dietary modification. Has lost more than 10 pounds. Continue current management plan. Past Medical History  History reviewed. No pertinent past medical history. Surgical History  Past Surgical History:   Procedure Laterality Date    HX HYSTERECTOMY  2009        Medications  Current Outpatient Medications   Medication Sig Dispense Refill    sertraline (ZOLOFT) 25 mg tablet TAKE 1 TABLET BY MOUTH EVERY DAY 90 Tablet 2    ezetimibe (ZETIA) 10 mg tablet TAKE 1 TABLET BY MOUTH EVERY DAY 90 Tablet 2    gemfibroziL (LOPID) 600 mg tablet TAKE 1 TABLET BY MOUTH TWICE A  Tab 0    valACYclovir (VALTREX) 500 mg tablet Take  by mouth daily.  alendronate (FOSAMAX) 35 mg tablet Take  by mouth every seven (7) days.  fish oil-omega-3 fatty acids 340-1,000 mg capsule Take 2 Caps by mouth daily.  glucosamine/chondr govea A sod (GLUCOSAMINE-CHONDROITIN) 750-600 mg tab Take 2 Tabs by mouth daily.  cholecalciferol (VITAMIN D3) 1,000 unit cap Take  by mouth daily.          Allergies  Allergies   Allergen Reactions    Pcn [Penicillins] Unknown (comments)       Family History  Family History   Problem Relation Age of Onset    Cancer Mother     Glaucoma Mother     Dementia Mother     Heart Disease Father     Cancer Maternal Grandmother        Social History  Social History     Socioeconomic History    Marital status:      Spouse name: Not on file    Number of children: Not on file    Years of education: Not on file    Highest education level: Not on file   Occupational History    Not on file   Tobacco Use    Smoking status: Current Some Day Smoker     Types: Cigars    Smokeless tobacco: Never Used   Vaping Use    Vaping Use: Never used   Substance and Sexual Activity    Alcohol use: Yes     Alcohol/week: 1.0 standard drinks     Types: 1 Shots of liquor per week     Comment: social    Drug use: No    Sexual activity: Yes   Other Topics Concern    Not on file   Social History Narrative    Not on file     Social Determinants of Health     Financial Resource Strain:     Difficulty of Paying Living Expenses:    Food Insecurity:     Worried About Running Out of Food in the Last Year:     [address] in the Last Year:    Transportation Needs:     Lack of Transportation (Medical):  Lack of Transportation (Non-Medical):    Physical Activity:     Days of Exercise per Week:     Minutes of Exercise per Session:    Stress:     Feeling of Stress :    Social Connections:     Frequency of Communication with Friends and Family:     Frequency of Social Gatherings with Friends and Family:     Attends Scientologist Services:     Active Member of Clubs or Organizations:     Attends Club or Organization Meetings:     Marital Status:    Intimate Partner Violence:     Fear of Current or Ex-Partner:     Emotionally Abused:     Physically Abused:     Sexually Abused:        Review of Systems  Review of Systems - Review of all systems is negative except as noted above in the HPI.     Vital Signs  Visit Vitals  /67 (BP 1 Location: Left upper arm, BP Patient Position: Sitting, BP Cuff Size: Adult)   Pulse 78   Temp (!) 96.3 °F (35.7 °C) (Oral)   Resp 18   Ht 5' 4\" (1.626 m)   Wt 160 lb (72.6 kg)   SpO2 99%   BMI 27.46 kg/m²       Physical Exam  Physical Examination: General appearance - alert, well appearing, and in no distress, oriented to person, place, and time and acyanotic, in no respiratory distress  Mental status - alert, oriented to person, place, and time, normal mood, behavior, speech, dress, motor activity, and thought processes  Lymphatics - no palpable lymphadenopathy, no hepatosplenomegaly  Chest - clear to auscultation, no wheezes, rales or rhonchi, symmetric air entry  Heart - normal rate, regular rhythm, normal S1, S2, no murmurs, rubs, clicks or gallops  Abdomen - soft, nontender, nondistended, no masses or organomegaly  Neurological - alert, oriented, normal speech, no focal findings or movement disorder noted  Musculoskeletal - full range of motion without pain  Extremities - intact peripheral pulses      Results  Results for orders placed or performed in visit on 05/12/21   ANTINUCLEAR ANTIBODIES, IFA   Result Value Ref Range    LISE SCREEN, IFA NEGATIVE NEGATIVE   MPL MUTATION ANALYSIS   Result Value Ref Range    Clinical indication Not Given     Specimen Source Not Given     Block/specimen ID Not given     MPL EXON 10 MUTATION Not Detected Not Detected    Interpretation see note     ASSAY DETAILS SEE BELOW    CALR MUTATION ANALYSIS   Result Value Ref Range    Clinical indication Not Given     SPECIMEN SOURCE Not Given     Block/specimen ID Not given     CALR Exon 9 Mutation Not Detected Not Detected    INTERPRETATION see note     ASSAY DETAILS SEE BELOW    BCR-ABL1, PCR, QT   Result Value Ref Range    PRIOR RESULT Not Given     SOURCE Peripheral Blood     BCR/ABL1/ABL1 % 0.000 %    BCR-ABL1/ABL1 % (IS) 0.000 %    INTERPRETATION see note    CBC WITH AUTOMATED DIFF   Result Value Ref Range    WBC 9.6 3.8 - 10.8 Thousand/uL    RBC 4.28 3.80 - 5.10 Million/uL    HGB 13.5 11.7 - 15.5 g/dL    HCT 40.9 35.0 - 45.0 %    MCV 95.6 80.0 - 100.0 fL    MCH 31.5 27.0 - 33.0 pg    MCHC 33.0 32.0 - 36.0 g/dL    RDW 13.6 11.0 - 15.0 %    PLATELET 654 318 - 520 Thousand/uL    MEAN PLATELET VOLUME 02.8 7.5 - 12.5 fL    ABS.  NEUTROPHILS 5,952 1,500 - 7,800 cells/uL    ABS. LYMPHOCYTES 2,890 850 - 3,900 cells/uL    ABS. MONOCYTES 595 200 - 950 cells/uL    ABS. EOSINOPHILS 154 15 - 500 cells/uL    ABS. BASOPHILS 10 0 - 200 cells/uL    Neutrophils 62 %    LYMPHOCYTES 30.1 %    MONOCYTES 6.2 %    EOSINOPHILS 1.6 %    BASOPHILS 0.1 %    COMMENT(S)     SED RATE (ESR)   Result Value Ref Range    ESR, WESTERGREN 17 0 - 30 mm/h   FERRITIN   Result Value Ref Range    Ferritin 210 16 - 288 ng/mL   IRON PROFILE   Result Value Ref Range    Iron 83 45 - 160 mcg/dL    Iron binding capacity 430 250 - 450 mcg/dL (calc)    % SATURATION 19 16 - 45 % (calc)   METABOLIC PANEL, COMPREHENSIVE   Result Value Ref Range    Glucose 99 65 - 99 mg/dL    BUN 18 7 - 25 mg/dL    Creatinine 1.14 (H) 0.50 - 0.99 mg/dL    GFR est non-AA 51 (L) > OR = 60 mL/min/1.73m2    GFR est AA 59 (L) > OR = 60 mL/min/1.73m2    BUN/Creatinine ratio 16 6 - 22 (calc)    Sodium 143 135 - 146 mmol/L    Potassium 5.0 3.5 - 5.3 mmol/L    Chloride 106 98 - 110 mmol/L    CO2 23 20 - 32 mmol/L    Calcium 10.2 8.6 - 10.4 mg/dL    Protein, total 7.2 6.1 - 8.1 g/dL    Albumin 4.6 3.6 - 5.1 g/dL    Globulin 2.6 1.9 - 3.7 g/dL (calc)    ALB/GLOBRATIO 1.8 1.0 - 2.5 (calc)    Bilirubin, total 0.7 0.2 - 1.2 mg/dL    Alkaline Phosphatase, total 80 37 - 153 U/L    AST (SGOT) 20 10 - 35 U/L    ALT (SGPT) 27 6 - 29 U/L   P190 BCR ABL1   Result Value Ref Range    P190 BCR ABL1 Not Detected    P210 BCR ABL1   Result Value Ref Range    P210 BCR ABL1 Not Detected    JAK2 V617F MUTATION ANALYSIS   Result Value Ref Range    Clinical indication Not Given     Specimen Source Not Given     Block/specimen ID Not given     JAK2 V617F Mutation Not Detected Not Detected    INTERPRETATION see note     ASSAY DETAILS SEE BELOW    PATHOLOGIST REVIEW SMEARS   Result Value Ref Range    HEMATOPATHOLOGY REVIEW *        ASSESSMENT and PLAN    ICD-10-CM ICD-9-CM    1.  Dyslipidemia  E78.5 272.4 gemfibroziL (LOPID) 600 mg tablet      LIPID PANEL   2. Stage 3a chronic kidney disease (HCC)  U91.60 116.6 METABOLIC PANEL, COMPREHENSIVE   3. Mood disorder (HCC)  F39 296.90    4. Overweight (BMI 25.0-29. 9)  E66.3 278.02      lab results and schedule of future lab studies reviewed with patient  reviewed diet, exercise and weight control  cardiovascular risk and specific lipid/LDL goals reviewed  reviewed medications and side effects in detail      I have discussed the diagnosis with the patient and the intended plan of care as seen in the above orders. The patient has received an after-visit summary and questions were answered concerning future plans. I have discussed medication, side effects, and warnings with the patient in detail. The patient verbalized understanding and is in agreement with the plan of care. The patient will contact the office with any additional concerns. Tee Collazo MD    PLEASE NOTE:   This document has been produced using voice recognition software.  Unrecognized errors in transcription may be present

## 2021-08-25 NOTE — PROGRESS NOTES
Chief Complaint   Patient presents with    Follow Up Chronic Condition     1. Have you been to the ER, urgent care clinic since your last visit? Hospitalized since your last visit? No    2. Have you seen or consulted any other health care providers outside of the [address] [place] [address] since your last visit? Include any pap smears or colon screening.  No

## 2021-09-14 DIAGNOSIS — E78.5 DYSLIPIDEMIA: ICD-10-CM

## 2021-09-14 RX ORDER — GEMFIBROZIL 600 MG/1
600 TABLET, FILM COATED ORAL 2 TIMES DAILY
Qty: 180 TABLET | Refills: 0 | Status: SHIPPED | OUTPATIENT
Start: 2021-09-14 | End: 2022-01-25

## 2021-11-17 ENCOUNTER — DOCUMENTATION ONLY (OUTPATIENT)
Dept: ONCOLOGY | Age: 66
End: 2021-11-17

## 2021-11-17 NOTE — PROGRESS NOTES
Patient called requesting labs drawn 05/12/2021 dx changed to Covid related. Patient received bill and per [employer] acct [de-identified]. Please advise.

## 2021-12-10 ENCOUNTER — TELEPHONE (OUTPATIENT)
Dept: FAMILY MEDICINE CLINIC | Age: 66
End: 2021-12-10

## 2021-12-10 NOTE — TELEPHONE ENCOUNTER
Pt stated she needs a medical record appeal because the lab orders that Dr. Davion Wood ordered needed a prior authorization but they never received one. For clarity Ms. Lowell Stockton can be reached at [phone]. Please advise.  Thank you!!!

## 2022-01-24 DIAGNOSIS — F39 MOOD DISORDER (HCC): ICD-10-CM

## 2022-01-24 DIAGNOSIS — E78.5 DYSLIPIDEMIA: ICD-10-CM

## 2022-01-25 RX ORDER — GEMFIBROZIL 600 MG/1
600 TABLET, FILM COATED ORAL 2 TIMES DAILY
Qty: 180 TABLET | Refills: 0 | Status: SHIPPED | OUTPATIENT
Start: 2022-01-25 | End: 2022-04-27

## 2022-01-25 RX ORDER — SERTRALINE HYDROCHLORIDE 25 MG/1
TABLET, FILM COATED ORAL
Qty: 90 TABLET | Refills: 2 | Status: SHIPPED | OUTPATIENT
Start: 2022-01-25 | End: 2022-10-12

## 2022-01-25 RX ORDER — EZETIMIBE 10 MG/1
TABLET ORAL
Qty: 90 TABLET | Refills: 2 | Status: SHIPPED | OUTPATIENT
Start: 2022-01-25 | End: 2022-10-12

## 2022-02-01 ENCOUNTER — OFFICE VISIT (OUTPATIENT)
Dept: FAMILY MEDICINE CLINIC | Age: 67
End: 2022-02-01
Payer: MEDICARE

## 2022-02-01 ENCOUNTER — TRANSCRIBE ORDER (OUTPATIENT)
Dept: SCHEDULING | Age: 67
End: 2022-02-01

## 2022-02-01 VITALS
HEIGHT: 64 IN | DIASTOLIC BLOOD PRESSURE: 66 MMHG | HEART RATE: 85 BPM | OXYGEN SATURATION: 99 % | WEIGHT: 168 LBS | SYSTOLIC BLOOD PRESSURE: 130 MMHG | TEMPERATURE: 98.3 F | RESPIRATION RATE: 20 BRPM | BODY MASS INDEX: 28.68 KG/M2

## 2022-02-01 DIAGNOSIS — Z00.00 WELCOME TO MEDICARE PREVENTIVE VISIT: ICD-10-CM

## 2022-02-01 DIAGNOSIS — N18.31 STAGE 3A CHRONIC KIDNEY DISEASE (HCC): ICD-10-CM

## 2022-02-01 DIAGNOSIS — D47.3 ESSENTIAL (HEMORRHAGIC) THROMBOCYTHEMIA (HCC): ICD-10-CM

## 2022-02-01 DIAGNOSIS — M25.551 HIP PAIN, ACUTE, RIGHT: Primary | ICD-10-CM

## 2022-02-01 DIAGNOSIS — Z12.31 VISIT FOR SCREENING MAMMOGRAM: Primary | ICD-10-CM

## 2022-02-01 DIAGNOSIS — Z23 ENCOUNTER FOR IMMUNIZATION: ICD-10-CM

## 2022-02-01 DIAGNOSIS — F39 MOOD DISORDER (HCC): ICD-10-CM

## 2022-02-01 DIAGNOSIS — Z71.89 ADVANCED DIRECTIVES, COUNSELING/DISCUSSION: ICD-10-CM

## 2022-02-01 PROBLEM — B07.9 VIRAL WART: Status: ACTIVE | Noted: 2018-06-06

## 2022-02-01 PROBLEM — L57.9 SKIN CHANGES DUE TO CHRONIC EXPOSURE TO NONIONIZING RADIATION: Status: ACTIVE | Noted: 2018-06-06

## 2022-02-01 PROBLEM — L82.1 SEBORRHEIC KERATOSIS: Status: ACTIVE | Noted: 2018-06-06

## 2022-02-01 PROCEDURE — G8399 PT W/DXA RESULTS DOCUMENT: HCPCS | Performed by: FAMILY MEDICINE

## 2022-02-01 PROCEDURE — G0402 INITIAL PREVENTIVE EXAM: HCPCS | Performed by: FAMILY MEDICINE

## 2022-02-01 PROCEDURE — G8427 DOCREV CUR MEDS BY ELIG CLIN: HCPCS | Performed by: FAMILY MEDICINE

## 2022-02-01 PROCEDURE — 3017F COLORECTAL CA SCREEN DOC REV: CPT | Performed by: FAMILY MEDICINE

## 2022-02-01 PROCEDURE — G8510 SCR DEP NEG, NO PLAN REQD: HCPCS | Performed by: FAMILY MEDICINE

## 2022-02-01 PROCEDURE — G8419 CALC BMI OUT NRM PARAM NOF/U: HCPCS | Performed by: FAMILY MEDICINE

## 2022-02-01 PROCEDURE — 90732 PPSV23 VACC 2 YRS+ SUBQ/IM: CPT | Performed by: FAMILY MEDICINE

## 2022-02-01 PROCEDURE — 1101F PT FALLS ASSESS-DOCD LE1/YR: CPT | Performed by: FAMILY MEDICINE

## 2022-02-01 PROCEDURE — G0009 ADMIN PNEUMOCOCCAL VACCINE: HCPCS | Performed by: FAMILY MEDICINE

## 2022-02-01 PROCEDURE — 99214 OFFICE O/P EST MOD 30 MIN: CPT | Performed by: FAMILY MEDICINE

## 2022-02-01 PROCEDURE — G8536 NO DOC ELDER MAL SCRN: HCPCS | Performed by: FAMILY MEDICINE

## 2022-02-01 PROCEDURE — 1090F PRES/ABSN URINE INCON ASSESS: CPT | Performed by: FAMILY MEDICINE

## 2022-02-01 PROCEDURE — G9899 SCRN MAM PERF RSLTS DOC: HCPCS | Performed by: FAMILY MEDICINE

## 2022-02-01 NOTE — ACP (ADVANCE CARE PLANNING)
Advance Care Planning     Advance Care Planning (ACP) Physician/NP/PA Conversation      Date of Conversation: 2/1/2022  Conducted with: Patient with Decision Making Capacity    Healthcare Decision Maker:     Primary Decision Maker (Active): Megha Nicolas - [phone]  Click here to complete [address] including selection of the Healthcare Decision Maker Relationship (ie \"Primary\")      Today we documented Decision Maker(s). The patient will provide ACP documents. Care Preferences:    Hospitalization: \"If your health worsens and it becomes clear that your chance of recovery is unlikely, what would be your preference regarding hospitalization? \"  The patient would prefer hospitalization. Ventilation: \"If you were unable to breathe on your own and your chance of recovery was unlikely, what would be your preference about the use of a ventilator (breathing machine) if it was available to you? \"   The patient would desire the use of a ventilator. Resuscitation: \"In the event your heart stopped as a result of an underlying serious health condition, would you want attempts to be made to restart your heart, or would you prefer a natural death? \"   Yes, attempt to resuscitate.     Additional topics discussed: treatment goals, ventilation preferences, hospitalization preferences and resuscitation preferences    Conversation Outcomes / Follow-Up Plan:   ACP in process - completing/providing documents   Reviewed DNR/DNI and patient elects Full Code (Attempt Resuscitation)     Length of Voluntary ACP Conversation in minutes:  16 minutes    Riley Crowder MD

## 2022-02-01 NOTE — PROGRESS NOTES
This is a \"Welcome to [employer]"  Initial Preventive Physical Examination (IPPE) providing Personalized Prevention Plan Services (Performed in the first 12 months of enrollment)    I have reviewed the patient's medical history in detail and updated the computerized patient record. Chief Complaint   Patient presents with    Annual Wellness Visit    Hip Pain     1. \"Have you been to the ER, urgent care clinic since your last visit? Hospitalized since your last visit? \" No    2. \"Have you seen or consulted any other health care providers outside of the [address] [place] [address] since your last visit? \" No     3. For patients aged 39-70: Has the patient had a colonoscopy / FIT/ Cologuard? Yes - no Care Gap present      If the patient is female:    4. For patients aged 41-77: Has the patient had a mammogram within the past 2 years? Yes - no Care Gap present      5. For patients aged 21-65: Has the patient had a pap smear? Yes - no Care Gap present        Assessment/Plan   Education and counseling provided:  Are appropriate based on today's review and evaluation    1. Welcome to Medicare preventive visit    2. Encounter for immunization  - PNEUMOCOCCAL POLYSACCHARIDE VACCINE, 23-VALENT, ADULT OR IMMUNOSUPPRESSED PT DOSE,  - ADMIN PNEUMOCOCCAL VACCINE    3.  Advanced directives, counseling/discussion  - ADVANCE CARE PLANNING FIRST 30 MINS  - FULL CODE       Depression Risk Screen     3 most recent PHQ Screens 2/1/2022   Little interest or pleasure in doing things Not at all   Feeling down, depressed, irritable, or hopeless Not at all   Total Score PHQ 2 0   Trouble falling or staying asleep, or sleeping too much Not at all   Feeling tired or having little energy Not at all   Poor appetite, weight loss, or overeating Not at all   Feeling bad about yourself - or that you are a failure or have let yourself or your family down Not at all   Trouble concentrating on things such as school, work, reading, or watching TV Not at all   Moving or speaking so slowly that other people could have noticed; or the opposite being so fidgety that others notice Not at all   Thoughts of being better off dead, or hurting yourself in some way Not at all   PHQ 9 Score 0   How difficult have these problems made it for you to do your work, take care of your home and get along with others Not difficult at all       Alcohol & Drug Abuse Risk Screen    Do you average more than 1 drink per night or more than 7 drinks a week:  No    On any one occasion in the past three months have you have had more than 3 drinks containing alcohol:  No          Functional Ability and Level of Safety    Diet: No special diet       1. Was the patient's timed Up and GO test unsteady or longer than 30 seconds? No  2. Does the patient need help with the phone, transportation, shopping, preparing meals, housework, laundry, medications or managing money? No  3. Does the patients' home have rugs in the hallway, lack grab bars in the bathroom, lack handrails on the stairs or have poor lighting? No  4. Have you noticed any hearing difficulties? No  Hearing Evaluation:       Hearing: Hearing is good. Vision Screening:  Vision is good. No exam data present      Activities of Daily Living: The home contains: no safety equipment. Patient does total self care      Ambulation: with no difficulty      Exercise level: moderately active     Fall Risk Screen:  Fall Risk Assessment, last 12 mths 2/1/2022   Able to walk? Yes   Fall in past 12 months? 0   Do you feel unsteady? 0   Are you worried about falling 0      Abuse Screen:  Patient is not abused       Screening EKG   EKG order placed: Yes    End of Life Planning   Advanced care planning directives were discussed with the patient and /or family/caregiver.      Health Maintenance Due     Health Maintenance Due   Topic Date Due    COVID-19 Vaccine (3 - Booster for Moderna series) 09/15/2021    Pneumococcal 65+ years (2 of 2 - PPSV23) 10/29/2021    Medicare Yearly Exam  01/23/2022       Patient Care Team   Patient Care Team:  Jazmin Norman MD as PCP - General (Family Medicine)  Jazmin Norman MD as PCP - [facility] Provider    History   Quita Heredia comes in for welcome to Medicare exam.    History reviewed. No pertinent past medical history. Past Surgical History:   Procedure Laterality Date    HX HYSTERECTOMY  2009     Current Outpatient Medications   Medication Sig Dispense Refill    gemfibroziL (LOPID) 600 mg tablet TAKE 1 TABLET BY MOUTH TWO (2) TIMES A DAY. 180 Tablet 0    ezetimibe (ZETIA) 10 mg tablet TAKE 1 TABLET BY MOUTH EVERY DAY 90 Tablet 2    sertraline (ZOLOFT) 25 mg tablet TAKE 1 TABLET BY MOUTH EVERY DAY 90 Tablet 2    valACYclovir (VALTREX) 500 mg tablet Take  by mouth daily.  alendronate (FOSAMAX) 35 mg tablet Take  by mouth every seven (7) days.  fish oil-omega-3 fatty acids 340-1,000 mg capsule Take 2 Caps by mouth daily.  glucosamine/chondr govea A sod (GLUCOSAMINE-CHONDROITIN) 750-600 mg tab Take 2 Tabs by mouth daily.  cholecalciferol (VITAMIN D3) 1,000 unit cap Take  by mouth daily. Allergies   Allergen Reactions    Pcn [Penicillins] Unknown (comments)       Family History   Problem Relation Age of Onset    Cancer Mother     Glaucoma Mother     Dementia Mother     Heart Disease Father     Cancer Maternal Grandmother      Social History     Tobacco Use    Smoking status: Current Some Day Smoker     Types: Cigars    Smokeless tobacco: Never Used   Substance Use Topics    Alcohol use: Yes     Alcohol/week: 1.0 standard drink     Types: 1 Shots of liquor per week     Comment: social     I have discussed the diagnosis with the patient and the intended plan of care as seen in the above orders. The patient has received an after-visit summary and questions were answered concerning future plans.  I have discussed medication, side effects, and warnings with the patient in detail. The patient verbalized understanding and is in agreement with the plan of care. The patient will contact the office with any additional concerns. Personalized preventative plan of care was discussed, printed and given to patient.     Daniel Schaefer MD

## 2022-02-01 NOTE — PATIENT INSTRUCTIONS
Medicare Wellness Visit, Female     The best way to live healthy is to have a lifestyle where you eat a well-balanced diet, exercise regularly, limit alcohol use, and quit all forms of tobacco/nicotine, if applicable. Regular preventive services are another way to keep healthy. Preventive services (vaccines, screening tests, monitoring & exams) can help personalize your care plan, which helps you manage your own care. Screening tests can find health problems at the earliest stages, when they are easiest to treat. [facility] follows the current, evidence-based guidelines published by the [facility] Suman Kessler ([facility]) when recommending preventive services for our patients. Because we follow these guidelines, sometimes recommendations change over time as research supports it. (For example, mammograms used to be recommended annually. Even though Medicare will still pay for an annual mammogram, the newer guidelines recommend a mammogram every two years for women of average risk). Of course, you and your doctor may decide to screen more often for some diseases, based on your risk and your co-morbidities (chronic disease you are already diagnosed with). Preventive services for you include:  - Medicare offers their members a free annual wellness visit, which is time for you and your primary care provider to discuss and plan for your preventive service needs. Take advantage of this benefit every year!  -All adults over the age of 72 should receive the recommended pneumonia vaccines. Current USPSTF guidelines recommend a series of two vaccines for the best pneumonia protection.   -All adults should have a flu vaccine yearly and a tetanus vaccine every 10 years.   -All adults age 48 and older should receive the shingles vaccines (series of two vaccines).       -All adults age 38-68 who are overweight should have a diabetes screening test once every three years.   -All adults born between 80 and 1965 should be screened once for Hepatitis C.  -Other screening tests and preventive services for persons with diabetes include: an eye exam to screen for diabetic retinopathy, a kidney function test, a foot exam, and stricter control over your cholesterol.   -Cardiovascular screening for adults with routine risk involves an electrocardiogram (ECG) at intervals determined by your doctor.   -Colorectal cancer screenings should be done for adults age 54-65 with no increased risk factors for colorectal cancer. There are a number of acceptable methods of screening for this type of cancer. Each test has its own benefits and drawbacks. Discuss with your doctor what is most appropriate for you during your annual wellness visit. The different tests include: colonoscopy (considered the best screening method), a fecal occult blood test, a fecal DNA test, and sigmoidoscopy.    -A bone mass density test is recommended when a woman turns 65 to screen for osteoporosis. This test is only recommended one time, as a screening. Some providers will use this same test as a disease monitoring tool if you already have osteoporosis. -Breast cancer screenings are recommended every other year for women of normal risk, age 54-69.  -Cervical cancer screenings for women over age 72 are only recommended with certain risk factors. Here is a list of your current Health Maintenance items (your personalized list of preventive services) with a due date:  Health Maintenance Due   Topic Date Due    COVID-19 Vaccine (3 - Booster for Moderna series) 09/15/2021    Pneumococcal Vaccine (2 of 2 - PPSV23) 10/29/2021    Annual Well Visit  01/23/2022         Medicare Wellness Visit, Female     The best way to live healthy is to have a lifestyle where you eat a well-balanced diet, exercise regularly, limit alcohol use, and quit all forms of tobacco/nicotine, if applicable.      Regular preventive services are another way to keep healthy. Preventive services (vaccines, screening tests, monitoring & exams) can help personalize your care plan, which helps you manage your own care. Screening tests can find health problems at the earliest stages, when they are easiest to treat. [facility] follows the current, evidence-based guidelines published by the [facility] Suman Kessler ([facility]) when recommending preventive services for our patients. Because we follow these guidelines, sometimes recommendations change over time as research supports it. (For example, mammograms used to be recommended annually. Even though Medicare will still pay for an annual mammogram, the newer guidelines recommend a mammogram every two years for women of average risk). Of course, you and your doctor may decide to screen more often for some diseases, based on your risk and your co-morbidities (chronic disease you are already diagnosed with). Preventive services for you include:  - Medicare offers their members a free annual wellness visit, which is time for you and your primary care provider to discuss and plan for your preventive service needs. Take advantage of this benefit every year!  -All adults over the age of 72 should receive the recommended pneumonia vaccines. Current USPSTF guidelines recommend a series of two vaccines for the best pneumonia protection.   -All adults should have a flu vaccine yearly and a tetanus vaccine every 10 years.   -All adults age 48 and older should receive the shingles vaccines (series of two vaccines).       -All adults age 38-68 who are overweight should have a diabetes screening test once every three years.   -All adults born between 80 and 1965 should be screened once for Hepatitis C.  -Other screening tests and preventive services for persons with diabetes include: an eye exam to screen for diabetic retinopathy, a kidney function test, a foot exam, and stricter control over your cholesterol.   -Cardiovascular screening for adults with routine risk involves an electrocardiogram (ECG) at intervals determined by your doctor.   -Colorectal cancer screenings should be done for adults age 54-65 with no increased risk factors for colorectal cancer. There are a number of acceptable methods of screening for this type of cancer. Each test has its own benefits and drawbacks. Discuss with your doctor what is most appropriate for you during your annual wellness visit. The different tests include: colonoscopy (considered the best screening method), a fecal occult blood test, a fecal DNA test, and sigmoidoscopy.    -A bone mass density test is recommended when a woman turns 65 to screen for osteoporosis. This test is only recommended one time, as a screening. Some providers will use this same test as a disease monitoring tool if you already have osteoporosis. -Breast cancer screenings are recommended every other year for women of normal risk, age 54-69.  -Cervical cancer screenings for women over age 72 are only recommended with certain risk factors. Here is a list of your current Health Maintenance items (your personalized list of preventive services) with a due date:  Health Maintenance Due   Topic Date Due    COVID-19 Vaccine (3 - Booster for Shane Liz series) 09/15/2021         Medicare Wellness Visit, Female     The best way to live healthy is to have a lifestyle where you eat a well-balanced diet, exercise regularly, limit alcohol use, and quit all forms of tobacco/nicotine, if applicable. Regular preventive services are another way to keep healthy. Preventive services (vaccines, screening tests, monitoring & exams) can help personalize your care plan, which helps you manage your own care. Screening tests can find health problems at the earliest stages, when they are easiest to treat.    [facility] follows the current, evidence-based guidelines published by the [place] [employer] Task Force (USPSTF) when recommending preventive services for our patients. Because we follow these guidelines, sometimes recommendations change over time as research supports it. (For example, mammograms used to be recommended annually. Even though Medicare will still pay for an annual mammogram, the newer guidelines recommend a mammogram every two years for women of average risk). Of course, you and your doctor may decide to screen more often for some diseases, based on your risk and your co-morbidities (chronic disease you are already diagnosed with). Preventive services for you include:  - Medicare offers their members a free annual wellness visit, which is time for you and your primary care provider to discuss and plan for your preventive service needs. Take advantage of this benefit every year!  -All adults over the age of 72 should receive the recommended pneumonia vaccines. Current USPSTF guidelines recommend a series of two vaccines for the best pneumonia protection.   -All adults should have a flu vaccine yearly and a tetanus vaccine every 10 years.   -All adults age 48 and older should receive the shingles vaccines (series of two vaccines). -All adults age 38-68 who are overweight should have a diabetes screening test once every three years.   -All adults born between 80 and 1965 should be screened once for Hepatitis C.  -Other screening tests and preventive services for persons with diabetes include: an eye exam to screen for diabetic retinopathy, a kidney function test, a foot exam, and stricter control over your cholesterol.   -Cardiovascular screening for adults with routine risk involves an electrocardiogram (ECG) at intervals determined by your doctor.   -Colorectal cancer screenings should be done for adults age 54-65 with no increased risk factors for colorectal cancer. There are a number of acceptable methods of screening for this type of cancer.  Each test has its own benefits and drawbacks. Discuss with your doctor what is most appropriate for you during your annual wellness visit. The different tests include: colonoscopy (considered the best screening method), a fecal occult blood test, a fecal DNA test, and sigmoidoscopy.    -A bone mass density test is recommended when a woman turns 65 to screen for osteoporosis. This test is only recommended one time, as a screening. Some providers will use this same test as a disease monitoring tool if you already have osteoporosis. -Breast cancer screenings are recommended every other year for women of normal risk, age 54-69.  -Cervical cancer screenings for women over age 72 are only recommended with certain risk factors.      Here is a list of your current Health Maintenance items (your personalized list of preventive services) with a due date:  Health Maintenance Due   Topic Date Due    COVID-19 Vaccine (3 - Booster for Daniel Divine series) 09/15/2021

## 2022-02-01 NOTE — PROGRESS NOTES
HPI  Jessika Anthony comes in for follow-up care. Hip pain: Patient has right hip pain. Pain is mainly in the medial aspect of the hip around the abductor muscles. Pain comes when she tries to cross her legs. She denies any trauma but states that she may have strained her hip while she was running. Pain has improved over the past 2 weeks as she has stopped most of her physical activity. She has tried more of the rest and taking Tylenol. She however still has the pain when she attempts to get out of the car or cross her legs. Suspect strain of the muscles. I will order an x-ray of the hip. Will refer to the orthopedist for evaluation and management. Mood disorder: Patient has a history of mood disorder with anxiety and depression. She is on Zoloft. Stable on medication. We will continue current treatment plan. CKD: Patient has chronic kidney disease stage III. Plan is to avoid nephrotoxic medications. We will recheck labs at next visit. Thrombocytosis: Patient has a history of thrombocytosis and has been followed up by the hematologist.  We will follow up with the recommendations as per the specialist.  Dyslipidemia: Patient has dyslipidemia. She is on Zetia and Lopid. Stable on the medications. We will recheck labs at next visit. Health maintenance: Patient will get the PPSV23 vaccine today. She is scheduled to have a mammogram done. Past Medical History  History reviewed. No pertinent past medical history. Surgical History  Past Surgical History:   Procedure Laterality Date    HX HYSTERECTOMY  2009        Medications  Current Outpatient Medications   Medication Sig Dispense Refill    gemfibroziL (LOPID) 600 mg tablet TAKE 1 TABLET BY MOUTH TWO (2) TIMES A DAY.  180 Tablet 0    ezetimibe (ZETIA) 10 mg tablet TAKE 1 TABLET BY MOUTH EVERY DAY 90 Tablet 2    sertraline (ZOLOFT) 25 mg tablet TAKE 1 TABLET BY MOUTH EVERY DAY 90 Tablet 2    valACYclovir (VALTREX) 500 mg tablet Take  by mouth daily.  alendronate (FOSAMAX) 35 mg tablet Take  by mouth every seven (7) days.  fish oil-omega-3 fatty acids 340-1,000 mg capsule Take 2 Caps by mouth daily.  glucosamine/chondr govea A sod (GLUCOSAMINE-CHONDROITIN) 750-600 mg tab Take 2 Tabs by mouth daily.  cholecalciferol (VITAMIN D3) 1,000 unit cap Take  by mouth daily. Allergies  Allergies   Allergen Reactions    Pcn [Penicillins] Unknown (comments)       Family History  Family History   Problem Relation Age of Onset    Cancer Mother     Glaucoma Mother     Dementia Mother     Heart Disease Father     Cancer Maternal Grandmother        Social History  Social History     Socioeconomic History    Marital status:      Spouse name: Not on file    Number of children: Not on file    Years of education: Not on file    Highest education level: Not on file   Occupational History    Not on file   Tobacco Use    Smoking status: Current Some Day Smoker     Types: Cigars    Smokeless tobacco: Never Used   Vaping Use    Vaping Use: Never used   Substance and Sexual Activity    Alcohol use: Yes     Alcohol/week: 1.0 standard drink     Types: 1 Shots of liquor per week     Comment: social    Drug use: No    Sexual activity: Yes   Other Topics Concern    Not on file   Social History Narrative    Not on file     Social Determinants of Health     Financial Resource Strain:     Difficulty of Paying Living Expenses: Not on file   Food Insecurity:     Worried About Running Out of Food in the Last Year: Not on file    [place] of Food in the Last Year: Not on file   Transportation Needs:     Lack of Transportation (Medical): Not on file    Lack of Transportation (Non-Medical):  Not on file   Physical Activity:     Days of Exercise per Week: Not on file    Minutes of Exercise per Session: Not on file   Stress:     Feeling of Stress : Not on file   Social Connections:     Frequency of Communication with Friends and Family: Not on file    Frequency of Social Gatherings with Friends and Family: Not on file    Attends Catholic Services: Not on file    Active Member of Clubs or Organizations: Not on file    Attends Club or Organization Meetings: Not on file    Marital Status: Not on file   Intimate Partner Violence:     Fear of Current or Ex-Partner: Not on file    Emotionally Abused: Not on file    Physically Abused: Not on file    Sexually Abused: Not on file   Housing Stability:     Unable to Pay for Housing in the Last Year: Not on file    Number of [place] in the Last Year: Not on file    Unstable Housing in the Last Year: Not on file       Review of Systems  Review of Systems - Review of all systems is negative except as noted above in the HPI.     Vital Signs  Visit Vitals  /66 (BP 1 Location: Left upper arm, BP Patient Position: Sitting, BP Cuff Size: Adult)   Pulse 85   Temp 98.3 °F (36.8 °C) (Oral)   Resp 20   Ht 5' 4\" (1.626 m)   Wt 168 lb (76.2 kg)   SpO2 99%   BMI 28.84 kg/m²         Physical Exam  Physical Examination: General appearance - alert, well appearing, and in no distress and oriented to person, place, and time  Mental status - alert, oriented to person, place, and time, affect appropriate to mood  Lymphatics - no palpable lymphadenopathy, no hepatosplenomegaly  Chest - clear to auscultation, no wheezes, rales or rhonchi, symmetric air entry  Heart - normal rate, regular rhythm, normal S1, S2, no murmurs, rubs, clicks or gallops  Abdomen - soft, nontender, nondistended, no masses or organomegaly  Back exam - limited range of motion  Neurological - abnormal neurological exam unchanged from prior examinations  Musculoskeletal -discomfort to internal and external rotation right hip  Extremities - no pedal edema noted, intact peripheral pulses      Results  Results for orders placed or performed during the hospital encounter of 79/86/37   METABOLIC PANEL, COMPREHENSIVE   Result Value Ref Range    Sodium 140 135 - 145 mmol/L    Potassium 4.2 3.2 - 5.1 mmol/L    Chloride 105 94 - 111 mmol/L    CO2 23 21 - 33 mmol/L    Anion gap 12 mmol/L    Glucose 84 70 - 110 mg/dL    BUN 16 9 - 21 mg/dL    Creatinine 1.00 0.70 - 1.20 mg/dL    BUN/Creatinine ratio 16      GFR est AA >60 ml/min/1.73m2    GFR est non-AA 56 ml/min/1.73m2    Calcium 10.1 8.5 - 10.5 mg/dL    Bilirubin, total 0.9 0.2 - 1.0 mg/dL    AST (SGOT) 24 14 - 74 U/L    ALT (SGPT) 29 3 - 52 U/L    Alk. phosphatase 67 38 - 126 U/L    Protein, total 7.6 6.1 - 8.4 g/dL    Albumin 4.3 3.5 - 4.7 g/dL    Globulin 3.3 g/dL    A-G Ratio 1.3     LIPID PANEL   Result Value Ref Range    LIPID PROFILE        Cholesterol, total 166 <200 mg/dL    Triglyceride 87 <150 mg/dL    HDL Cholesterol 51 40 - 60 mg/dL    LDL, calculated 97.6 0 - 100 mg/dL    VLDL, calculated 17.4 mg/dL    CHOL/HDL Ratio 3.3 0 - 5.0         ASSESSMENT and PLAN    ICD-10-CM ICD-9-CM    1. Hip pain, acute, right  M25.551 719.45 XR HIP RT W OR WO PELV 2-3 VWS      REFERRAL TO ORTHOPEDICS   2. Mood disorder (HCC)  F39 296.90    3. Essential [facility]hemorrhagic) thromb[facility]the[facility]  D47.3 238.71    4. Stage 3a chronic kidney disease (HCC)  N18.31 585.3    5. Welcome to Medicare preventive visit  Z00.00 V70.0    6. Encounter for immunization  Z23 V03.89 PNEUMOCOCCAL POLYSACCHARIDE VACCINE, 23-VALENT, ADULT OR IMMUNOSUPPRESSED PT DOSE,      ADMIN PNEUMOCOCCAL VACCINE   7. Advanced directives, counseling/discussion  Z71.89 V65.49 ADVANCE CARE PLANNING FIRST 30 MINS      FULL CODE     lab results and schedule of future lab studies reviewed with patient  reviewed diet, exercise and weight control  cardiovascular risk and specific lipid/LDL goals reviewed  reviewed medications and side effects in detail  radiology results and schedule of future radiology studies reviewed with patient      I have discussed the diagnosis with the patient and the intended plan of care as seen in the above orders.  The patient has received an after-visit summary and questions were answered concerning future plans. I have discussed medication, side effects, and warnings with the patient in detail. The patient verbalized understanding and is in agreement with the plan of care. The patient will contact the office with any additional concerns. Virginia Rodriguez MD    PLEASE NOTE:   This document has been produced using voice recognition software.  Unrecognized errors in transcription may be present

## 2022-02-01 NOTE — PROGRESS NOTES
After obtaining consent, and per orders of Dr. Raven Garcia, injection of pneumonia given by Viktoria Mcfarland. Patient instructed to remain in clinic for 20 minutes afterwards, and to report any adverse reaction to me immediately.

## 2022-03-02 ENCOUNTER — HOSPITAL ENCOUNTER (OUTPATIENT)
Dept: MAMMOGRAPHY | Age: 67
Discharge: HOME | End: 2022-03-02
Attending: OBSTETRICS & GYNECOLOGY
Payer: MEDICARE

## 2022-03-02 DIAGNOSIS — Z12.31 VISIT FOR SCREENING MAMMOGRAM: ICD-10-CM

## 2022-03-02 PROCEDURE — 77063 BREAST TOMOSYNTHESIS BI: CPT

## 2022-03-18 PROBLEM — L82.1 SEBORRHEIC KERATOSIS: Status: ACTIVE | Noted: 2018-06-06

## 2022-03-18 PROBLEM — L57.9 SKIN CHANGES DUE TO CHRONIC EXPOSURE TO NONIONIZING RADIATION: Status: ACTIVE | Noted: 2018-06-06

## 2022-03-19 PROBLEM — B07.9 VIRAL WART: Status: ACTIVE | Noted: 2018-06-06

## 2022-03-19 PROBLEM — E66.3 OVERWEIGHT (BMI 25.0-29.9): Status: ACTIVE | Noted: 2019-10-05

## 2022-03-31 ENCOUNTER — OFFICE VISIT (OUTPATIENT)
Dept: ORTHOPEDIC SURGERY | Age: 67
End: 2022-03-31
Payer: MEDICARE

## 2022-03-31 VITALS
HEIGHT: 64 IN | BODY MASS INDEX: 28.58 KG/M2 | OXYGEN SATURATION: 100 % | TEMPERATURE: 97.8 F | HEART RATE: 100 BPM | WEIGHT: 167.4 LBS

## 2022-03-31 DIAGNOSIS — Z79.83 H/O ONGOING TREATMENT WITH ALENDRONATE (FOSAMAX): ICD-10-CM

## 2022-03-31 DIAGNOSIS — M81.0 POST-MENOPAUSAL OSTEOPOROSIS: ICD-10-CM

## 2022-03-31 DIAGNOSIS — R10.31 RIGHT GROIN PAIN: ICD-10-CM

## 2022-03-31 DIAGNOSIS — M25.551 RIGHT HIP PAIN: Primary | ICD-10-CM

## 2022-03-31 PROCEDURE — 3017F COLORECTAL CA SCREEN DOC REV: CPT | Performed by: ORTHOPAEDIC SURGERY

## 2022-03-31 PROCEDURE — 73502 X-RAY EXAM HIP UNI 2-3 VIEWS: CPT | Performed by: ORTHOPAEDIC SURGERY

## 2022-03-31 PROCEDURE — 99204 OFFICE O/P NEW MOD 45 MIN: CPT | Performed by: ORTHOPAEDIC SURGERY

## 2022-03-31 PROCEDURE — G8432 DEP SCR NOT DOC, RNG: HCPCS | Performed by: ORTHOPAEDIC SURGERY

## 2022-03-31 PROCEDURE — 1090F PRES/ABSN URINE INCON ASSESS: CPT | Performed by: ORTHOPAEDIC SURGERY

## 2022-03-31 PROCEDURE — G8419 CALC BMI OUT NRM PARAM NOF/U: HCPCS | Performed by: ORTHOPAEDIC SURGERY

## 2022-03-31 PROCEDURE — G9899 SCRN MAM PERF RSLTS DOC: HCPCS | Performed by: ORTHOPAEDIC SURGERY

## 2022-03-31 PROCEDURE — G8427 DOCREV CUR MEDS BY ELIG CLIN: HCPCS | Performed by: ORTHOPAEDIC SURGERY

## 2022-03-31 PROCEDURE — G8536 NO DOC ELDER MAL SCRN: HCPCS | Performed by: ORTHOPAEDIC SURGERY

## 2022-03-31 PROCEDURE — 1101F PT FALLS ASSESS-DOCD LE1/YR: CPT | Performed by: ORTHOPAEDIC SURGERY

## 2022-03-31 NOTE — PROGRESS NOTES
Patient: Tamar Newsome                MRN: [id number]       SSN: [id number]  YOB: 1955        AGE: 73 y.o. SEX: female  Body mass index is 28.73 kg/m². PCP: Jonas Mora MD  03/31/22    He presents today with right-sided hip pain it is more medial of the proximal thigh on the right side and she thinks that she injured it exercising her resistance bands might of had some scissoring that tweaked it and its been a couple months ago its actually been feeling better although some days it can act up she has been on Fosamax for about 10 years and is read some side effects with that and we have seen some issues off and on without as well the pain is usually nonradicular medially based and is improved significantly in the last couple months    She otherwise healthy lady denies any heavy alcohol consumption or steroid use of long-term and she is retired she used to work at the else at the [employer]    Examination today she actually gets up and walks fairly normally the left hip rotates normally the right hip is just a touch stiff with flexion internal rotation and adduction but not particularly tender with a female assistant present we did examine the hip but more specifically not too tender over the trochanter and the medial adductor's actually were not too particularly tender today 8 I resisted adduction and abduction were actually fairly normal perhaps is slightly weaker on her adduction calf nontender Homans' sign is negative no evidence for infection or DVT.     X-rays today 3/31/2022 AP pelvis AP and lateral of the right hip I do not really see too much in the way of bony abnormalities    I am a bit concerned with her 3 months of discomfort of Fosamax and adductor pain I think would be recommended to get an MRI test to evaluate the adductor's will also ensure there is no edema in the bone and occasionally with Fosamax we do see subtrochanteric problems and other was this was more common in the early 2000's I will see her back after the MRI its been over a year since her bone density test I recommend a repeat on that as well when she returns we will be reviewing this there was a discussion regarding surgery and it was decided is not currently recommended will better evaluate after the scans and regular avoid the resistance bands for the time being until we see her back for test evaluation    REVIEW OF SYSTEMS:      CON: negative  EYE: negative   ENT: negative  RESP: negative  GI:    negative   :  negative  MSK: Positive  A twelve point review of systems was completed, positives noted and all other systems were reviewed and are negative          History reviewed. No pertinent past medical history. Family History   Problem Relation Age of Onset   Malini Coleman Cancer Mother     Glaucoma Mother     Dementia Mother     Breast Cancer Mother     Heart Disease Father     Cancer Maternal Grandmother     Breast Cancer Maternal Grandmother        Current Outpatient Medications   Medication Sig Dispense Refill    gemfibroziL (LOPID) 600 mg tablet TAKE 1 TABLET BY MOUTH TWO (2) TIMES A DAY. 180 Tablet 0    ezetimibe (ZETIA) 10 mg tablet TAKE 1 TABLET BY MOUTH EVERY DAY 90 Tablet 2    sertraline (ZOLOFT) 25 mg tablet TAKE 1 TABLET BY MOUTH EVERY DAY 90 Tablet 2    valACYclovir (VALTREX) 500 mg tablet Take  by mouth daily.  alendronate (FOSAMAX) 35 mg tablet Take  by mouth every seven (7) days.  fish oil-omega-3 fatty acids 340-1,000 mg capsule Take 2 Caps by mouth daily.  glucosamine/chondr govea A sod (GLUCOSAMINE-CHONDROITIN) 750-600 mg tab Take 2 Tabs by mouth daily.  cholecalciferol (VITAMIN D3) 1,000 unit cap Take  by mouth daily.  (Patient not taking: Reported on 3/31/2022)         Allergies   Allergen Reactions    Pcn [Penicillins] Unknown (comments)       Past Surgical History:   Procedure Laterality Date    HX HYSTERECTOMY  2009       Social History     Socioeconomic History    Marital status:      Spouse name: Not on file    Number of children: Not on file    Years of education: Not on file    Highest education level: Not on file   Occupational History    Not on file   Tobacco Use    Smoking status: Current Some Day Smoker     Types: Cigars    Smokeless tobacco: Never Used   Vaping Use    Vaping Use: Never used   Substance and Sexual Activity    Alcohol use: Yes     Alcohol/week: 1.0 standard drink     Types: 1 Shots of liquor per week     Comment: social    Drug use: No    Sexual activity: Yes   Other Topics Concern    Not on file   Social History Narrative    Not on file     Social Determinants of Health     Financial Resource Strain:     Difficulty of Paying Living Expenses: Not on file   Food Insecurity:     Worried About Running Out of Food in the Last Year: Not on file    [place] of Food in the Last Year: Not on file   Transportation Needs:     Lack of Transportation (Medical): Not on file    Lack of Transportation (Non-Medical):  Not on file   Physical Activity:     Days of Exercise per Week: Not on file    Minutes of Exercise per Session: Not on file   Stress:     Feeling of Stress : Not on file   Social Connections:     Frequency of Communication with Friends and Family: Not on file    Frequency of Social Gatherings with Friends and Family: Not on file    Attends Scientologist Services: Not on file    Active Member of [address] [place] [address] or Organizations: Not on file    Attends Club or Organization Meetings: Not on file    Marital Status: Not on file   Intimate Partner Violence:     Fear of Current or Ex-Partner: Not on file    Emotionally Abused: Not on file    Physically Abused: Not on file    Sexually Abused: Not on file   Housing Stability:     Unable to Pay for Housing in the Last Year: Not on file    Number of [place] in the Last Year: Not on file    Unstable Housing in the Last Year: Not on file       Visit Vitals  Pulse 100   Temp 97.8 °F (36.6 °C) (Temporal)   Ht 5' 4\" (1.626 m)   Wt 75.9 kg (167 lb 6.4 oz)   SpO2 100%   BMI 28.73 kg/m²         PHYSICAL EXAMINATION:  GENERAL: Alert and oriented x3, in no acute distress, well-developed, well-nourished, afebrile. HEART: No JVD. EYES: No scleral icterus   NECK: No significant lymphadenopathy   LUNGS: No respiratory compromise or indrawing  ABDOMEN: Soft, non-tender, non-distended. Note: This note was completed using voice recognition software. Any typographical/name errors or mistakes are unintentional.    Electronically signed by:  Otto Beltrán MD

## 2022-04-11 ENCOUNTER — HOSPITAL ENCOUNTER (OUTPATIENT)
Dept: MAMMOGRAPHY | Age: 67
Discharge: HOME | End: 2022-04-11
Payer: MEDICARE

## 2022-04-11 ENCOUNTER — HOSPITAL ENCOUNTER (OUTPATIENT)
Dept: MRI IMAGING | Age: 67
Discharge: HOME | End: 2022-04-11
Payer: MEDICARE

## 2022-04-11 DIAGNOSIS — Z79.83 H/O ONGOING TREATMENT WITH ALENDRONATE (FOSAMAX): ICD-10-CM

## 2022-04-11 DIAGNOSIS — M81.0 POST-MENOPAUSAL OSTEOPOROSIS: ICD-10-CM

## 2022-04-11 DIAGNOSIS — R10.31 RIGHT GROIN PAIN: ICD-10-CM

## 2022-04-11 PROCEDURE — 77080 DXA BONE DENSITY AXIAL: CPT

## 2022-04-11 PROCEDURE — 73718 MRI LOWER EXTREMITY W/O DYE: CPT

## 2022-04-21 DIAGNOSIS — M25.551 HIP PAIN, ACUTE, RIGHT: ICD-10-CM

## 2022-04-21 PROCEDURE — 73502 X-RAY EXAM HIP UNI 2-3 VIEWS: CPT | Performed by: FAMILY MEDICINE

## 2022-04-25 DIAGNOSIS — E78.5 DYSLIPIDEMIA: ICD-10-CM

## 2022-04-25 RX ORDER — GEMFIBROZIL 600 MG/1
600 TABLET, FILM COATED ORAL 2 TIMES DAILY
Qty: 180 TABLET | Refills: 0 | Status: CANCELLED | OUTPATIENT
Start: 2022-04-25

## 2022-04-25 NOTE — TELEPHONE ENCOUNTER
Requested Prescriptions     Pending Prescriptions Disp Refills    gemfibroziL (LOPID) 600 mg tablet 180 Tablet 0     Sig: Take 1 Tablet by mouth two (2) times a day.

## 2022-04-27 DIAGNOSIS — E78.5 DYSLIPIDEMIA: ICD-10-CM

## 2022-04-27 RX ORDER — GEMFIBROZIL 600 MG/1
TABLET, FILM COATED ORAL
Qty: 180 TABLET | Refills: 0 | Status: SHIPPED | OUTPATIENT
Start: 2022-04-27 | End: 2022-08-21

## 2022-06-01 ENCOUNTER — OFFICE VISIT (OUTPATIENT)
Dept: FAMILY MEDICINE CLINIC | Age: 67
End: 2022-06-01
Payer: MEDICARE

## 2022-06-01 ENCOUNTER — HOSPITAL ENCOUNTER (OUTPATIENT)
Dept: LAB | Age: 67
Discharge: HOME | End: 2022-06-01
Payer: MEDICARE

## 2022-06-01 VITALS
DIASTOLIC BLOOD PRESSURE: 72 MMHG | WEIGHT: 167 LBS | HEIGHT: 64 IN | OXYGEN SATURATION: 96 % | RESPIRATION RATE: 20 BRPM | TEMPERATURE: 98 F | BODY MASS INDEX: 28.51 KG/M2 | SYSTOLIC BLOOD PRESSURE: 121 MMHG | HEART RATE: 72 BPM

## 2022-06-01 DIAGNOSIS — N18.31 STAGE 3A CHRONIC KIDNEY DISEASE (HCC): ICD-10-CM

## 2022-06-01 DIAGNOSIS — F39 MOOD DISORDER (HCC): ICD-10-CM

## 2022-06-01 DIAGNOSIS — E78.5 DYSLIPIDEMIA: ICD-10-CM

## 2022-06-01 DIAGNOSIS — M81.0 POST-MENOPAUSAL OSTEOPOROSIS: Primary | ICD-10-CM

## 2022-06-01 DIAGNOSIS — E78.5 DYSLIPIDEMIA: Primary | ICD-10-CM

## 2022-06-01 PROBLEM — N18.30 CHRONIC RENAL DISEASE, STAGE III (HCC): Status: ACTIVE | Noted: 2022-06-01

## 2022-06-01 LAB
ALBUMIN SERPL-MCNC: 4.2 G/DL (ref 3.4–5)
ALBUMIN/GLOB SERPL: 1 {RATIO} (ref 0.8–1.7)
ALP SERPL-CCNC: 70 U/L (ref 45–117)
ALT SERPL-CCNC: 46 U/L (ref 13–56)
ANION GAP SERPL CALC-SCNC: 13 MMOL/L (ref 3–18)
AST SERPL W P-5'-P-CCNC: 27 U/L (ref 10–38)
BASOPHILS # BLD: 0 K/UL (ref 0–0.1)
BASOPHILS NFR BLD: 0 % (ref 0–2)
BILIRUB SERPL-MCNC: 0.7 MG/DL (ref 0.2–1)
BUN SERPL-MCNC: 19 MG/DL (ref 7–18)
BUN/CREAT SERPL: 16 (ref 12–20)
CA-I BLD-MCNC: 10.5 MG/DL (ref 8.5–10.1)
CHLORIDE SERPL-SCNC: 105 MMOL/L (ref 100–111)
CO2 SERPL-SCNC: 21 MMOL/L (ref 21–32)
CREAT SERPL-MCNC: 1.21 MG/DL (ref 0.6–1.3)
DIFFERENTIAL METHOD BLD: ABNORMAL
EOSINOPHIL # BLD: 0.2 K/UL (ref 0–0.4)
EOSINOPHIL NFR BLD: 1 % (ref 0–5)
ERYTHROCYTE [DISTWIDTH] IN BLOOD BY AUTOMATED COUNT: 12.1 % (ref 11.6–14.5)
GLOBULIN SER CALC-MCNC: 4.1 G/DL (ref 2–4)
GLUCOSE SERPL-MCNC: 95 MG/DL (ref 74–99)
HCT VFR BLD AUTO: 41.4 % (ref 35–45)
HGB BLD-MCNC: 14.1 G/DL (ref 12–16)
IMM GRANULOCYTES # BLD AUTO: 0.1 K/UL (ref 0–0.04)
IMM GRANULOCYTES NFR BLD AUTO: 1 % (ref 0–0.5)
LYMPHOCYTES # BLD: 3.7 K/UL (ref 0.9–3.6)
LYMPHOCYTES NFR BLD: 35 % (ref 21–52)
MCH RBC QN AUTO: 31.3 PG (ref 24–34)
MCHC RBC AUTO-ENTMCNC: 34.1 G/DL (ref 31–37)
MCV RBC AUTO: 91.8 FL (ref 78–100)
MONOCYTES # BLD: 0.7 K/UL (ref 0.05–1.2)
MONOCYTES NFR BLD: 7 % (ref 3–10)
NEUTS SEG # BLD: 5.9 K/UL (ref 1.8–8)
NEUTS SEG NFR BLD: 56 % (ref 40–73)
NRBC # BLD: 0 K/UL (ref 0–0.01)
NRBC BLD-RTO: 0 PER 100 WBC
PLATELET # BLD AUTO: 390 K/UL (ref 135–420)
PMV BLD AUTO: 11.3 FL (ref 9.2–11.8)
POTASSIUM SERPL-SCNC: 4.4 MMOL/L (ref 3.5–5.5)
PROT SERPL-MCNC: 8.3 G/DL (ref 6.4–8.2)
RBC # BLD AUTO: 4.51 M/UL (ref 4.2–5.3)
SODIUM SERPL-SCNC: 139 MMOL/L (ref 136–145)
WBC # BLD AUTO: 10.5 K/UL (ref 4.6–13.2)

## 2022-06-01 PROCEDURE — 1101F PT FALLS ASSESS-DOCD LE1/YR: CPT | Performed by: FAMILY MEDICINE

## 2022-06-01 PROCEDURE — 85025 COMPLETE CBC W/AUTO DIFF WBC: CPT

## 2022-06-01 PROCEDURE — G8427 DOCREV CUR MEDS BY ELIG CLIN: HCPCS | Performed by: FAMILY MEDICINE

## 2022-06-01 PROCEDURE — 1090F PRES/ABSN URINE INCON ASSESS: CPT | Performed by: FAMILY MEDICINE

## 2022-06-01 PROCEDURE — G9899 SCRN MAM PERF RSLTS DOC: HCPCS | Performed by: FAMILY MEDICINE

## 2022-06-01 PROCEDURE — 99213 OFFICE O/P EST LOW 20 MIN: CPT | Performed by: FAMILY MEDICINE

## 2022-06-01 PROCEDURE — G8536 NO DOC ELDER MAL SCRN: HCPCS | Performed by: FAMILY MEDICINE

## 2022-06-01 PROCEDURE — 3017F COLORECTAL CA SCREEN DOC REV: CPT | Performed by: FAMILY MEDICINE

## 2022-06-01 PROCEDURE — G8399 PT W/DXA RESULTS DOCUMENT: HCPCS | Performed by: FAMILY MEDICINE

## 2022-06-01 PROCEDURE — G8510 SCR DEP NEG, NO PLAN REQD: HCPCS | Performed by: FAMILY MEDICINE

## 2022-06-01 PROCEDURE — G8417 CALC BMI ABV UP PARAM F/U: HCPCS | Performed by: FAMILY MEDICINE

## 2022-06-01 PROCEDURE — 80053 COMPREHEN METABOLIC PANEL: CPT

## 2022-06-01 PROCEDURE — 36415 COLL VENOUS BLD VENIPUNCTURE: CPT

## 2022-06-01 PROCEDURE — 80061 LIPID PANEL: CPT

## 2022-06-01 PROCEDURE — 1123F ACP DISCUSS/DSCN MKR DOCD: CPT | Performed by: FAMILY MEDICINE

## 2022-06-01 NOTE — PROGRESS NOTES
JAMAL Baron is in for follow-up care. Dyslipidemia: Patient has dyslipidemia. She is on gemfibrozil and Zetia. She will continue to exercise and take a diet low in polysaturated fats. I will recheck labs today. Mood disorder: Patient has mood disorder with anxiety and depression. She takes Zoloft daily. Has been stable on the medication. We will continue current treatment plan. CKD: Patient has a history of CKD stage IIIa. We will recheck labs today. Plan is to avoid any nephrotoxic medications. Hip pain: Patient has a history of hip pain. This was due to muscle strain. Was seen by the orthopedist.  Had MRI done that was stable. Currently she is stable. Thrombocytosis: Patient noted to have thrombocytosis. Was seen by the hematologist.  Tests done were reassuring. Overweight: Patient has a BMI of 28.67. She will intensify lifestyle and dietary modification. Past Medical History  History reviewed. No pertinent past medical history. Surgical History  Past Surgical History:   Procedure Laterality Date    HX HYSTERECTOMY  2009        Medications  Current Outpatient Medications   Medication Sig Dispense Refill    gemfibroziL (LOPID) 600 mg tablet TAKE 1 TABLET BY MOUTH TWO TIMES A DAY. 180 Tablet 0    ezetimibe (ZETIA) 10 mg tablet TAKE 1 TABLET BY MOUTH EVERY DAY 90 Tablet 2    sertraline (ZOLOFT) 25 mg tablet TAKE 1 TABLET BY MOUTH EVERY DAY 90 Tablet 2    valACYclovir (VALTREX) 500 mg tablet Take  by mouth daily.  alendronate (FOSAMAX) 35 mg tablet Take  by mouth every seven (7) days.  fish oil-omega-3 fatty acids 340-1,000 mg capsule Take 2 Caps by mouth daily.  glucosamine/chondr govea A sod (GLUCOSAMINE-CHONDROITIN) 750-600 mg tab Take 2 Tabs by mouth daily.  cholecalciferol (VITAMIN D3) 1,000 unit cap Take  by mouth daily.  (Patient not taking: Reported on 3/31/2022)         Allergies  Allergies   Allergen Reactions    Pcn [Penicillins] Unknown (comments) Family History  Family History   Problem Relation Age of Onset    Cancer Mother     Glaucoma Mother     Dementia Mother     Breast Cancer Mother     Heart Disease Father     Cancer Maternal Grandmother     Breast Cancer Maternal Grandmother        Social History  Social History     Socioeconomic History    Marital status:      Spouse name: Not on file    Number of children: Not on file    Years of education: Not on file    Highest education level: Not on file   Occupational History    Not on file   Tobacco Use    Smoking status: Current Some Day Smoker     Types: Cigars    Smokeless tobacco: Never Used   Vaping Use    Vaping Use: Never used   Substance and Sexual Activity    Alcohol use: Yes     Alcohol/week: 1.0 standard drink     Types: 1 Shots of liquor per week     Comment: social    Drug use: No    Sexual activity: Yes   Other Topics Concern    Not on file   Social History Narrative    Not on file     Social Determinants of Health     Financial Resource Strain:     Difficulty of Paying Living Expenses: Not on file   Food Insecurity:     Worried About Running Out of Food in the Last Year: Not on file    [place] of Food in the Last Year: Not on file   Transportation Needs:     Lack of Transportation (Medical): Not on file    Lack of Transportation (Non-Medical):  Not on file   Physical Activity:     Days of Exercise per Week: Not on file    Minutes of Exercise per Session: Not on file   Stress:     Feeling of Stress : Not on file   Social Connections:     Frequency of Communication with Friends and Family: Not on file    Frequency of Social Gatherings with Friends and Family: Not on file    Attends Latter day Services: Not on file    Active Member of Clubs or Organizations: Not on file    Attends Club or Organization Meetings: Not on file    Marital Status: Not on file   Intimate Partner Violence:     Fear of Current or Ex-Partner: Not on file    Emotionally Abused: Not on file    Physically Abused: Not on file    Sexually Abused: Not on file   Housing Stability:     Unable to Pay for Housing in the Last Year: Not on file    Number of Places Lived in the Last Year: Not on file    Unstable Housing in the Last Year: Not on file       Review of Systems  Review of Systems - Review of all systems is negative except as noted above in the HPI.     Vital Signs  Visit Vitals  /72 (BP 1 Location: Left upper arm, BP Patient Position: Sitting, BP Cuff Size: Adult)   Pulse 72   Temp 98 °F (36.7 °C) (Temporal)   Resp 20   Ht 5' 4\" (1.626 m)   Wt 167 lb (75.8 kg)   SpO2 96%   BMI 28.67 kg/m²         Physical Exam  Physical Examination: General appearance - alert, well appearing, and in no distress, oriented to person, place, and time and acyanotic, in no respiratory distress  Mental status - alert, oriented to person, place, and time, affect appropriate to mood  Chest - clear to auscultation, no wheezes, rales or rhonchi, symmetric air entry  Heart - normal rate, regular rhythm, normal S1, S2, no murmurs, rubs, clicks or gallops  Abdomen - no rebound tenderness noted  Back exam - limited range of motion  Neurological - motor and sensory grossly normal bilaterally  Musculoskeletal - osteoarthritic changes noted in both hands  Extremities - no pedal edema noted, intact peripheral pulses      Results  Results for orders placed or performed during the hospital encounter of 53/82/56   METABOLIC PANEL, COMPREHENSIVE   Result Value Ref Range    Sodium 140 135 - 145 mmol/L    Potassium 4.2 3.2 - 5.1 mmol/L    Chloride 105 94 - 111 mmol/L    CO2 23 21 - 33 mmol/L    Anion gap 12 mmol/L    Glucose 84 70 - 110 mg/dL    BUN 16 9 - 21 mg/dL    Creatinine 1.00 0.70 - 1.20 mg/dL    BUN/Creatinine ratio 16      GFR est AA >60 ml/min/1.73m2    GFR est non-AA 56 ml/min/1.73m2    Calcium 10.1 8.5 - 10.5 mg/dL    Bilirubin, total 0.9 0.2 - 1.0 mg/dL    AST (SGOT) 24 14 - 74 U/L    ALT (SGPT) 29 3 - 52 U/L    Alk. phosphatase 67 38 - 126 U/L    Protein, total 7.6 6.1 - 8.4 g/dL    Albumin 4.3 3.5 - 4.7 g/dL    Globulin 3.3 g/dL    A-G Ratio 1.3     LIPID PANEL   Result Value Ref Range    LIPID PROFILE        Cholesterol, total 166 <200 mg/dL    Triglyceride 87 <150 mg/dL    HDL Cholesterol 51 40 - 60 mg/dL    LDL, calculated 97.6 0 - 100 mg/dL    VLDL, calculated 17.4 mg/dL    CHOL/HDL Ratio 3.3 0 - 5.0         ASSESSMENT and PLAN    ICD-10-CM ICD-9-CM    1. Dyslipidemia  E78.5 272.4 LIPID PANEL   2. Stage 3a chronic kidney disease (HCC)  L38.00 776.3 METABOLIC PANEL, COMPREHENSIVE      CBC WITH AUTOMATED DIFF   3. [facility] disorder ([facility]  F39 296.90      lab results and schedule of future lab studies reviewed with patient  reviewed diet, exercise and weight control  cardiovascular risk and specific lipid/LDL goals reviewed  reviewed medications and side effects in detail      I have discussed the diagnosis with the patient and the intended plan of care as seen in the above orders. The patient has received an after-visit summary and questions were answered concerning future plans. I have discussed medication, side effects, and warnings with the patient in detail. The patient verbalized understanding and is in agreement with the plan of care. The patient will contact the office with any additional concerns. Joslyn Moore MD    PLEASE NOTE:   This document has been produced using voice recognition software.  Unrecognized errors in transcription may be present

## 2022-06-02 LAB
CHOLEST SERPL-MCNC: 180 MG/DL
HDLC SERPL-MCNC: 49 MG/DL (ref 40–60)
HDLC SERPL: 3.7 {RATIO} (ref 0–5)
LDLC SERPL CALC-MCNC: 105.8 MG/DL (ref 0–100)
LIPID PROFILE,FLP: ABNORMAL
TRIGL SERPL-MCNC: 126 MG/DL (ref ?–150)
VLDLC SERPL CALC-MCNC: 25.2 MG/DL

## 2022-06-06 NOTE — PROGRESS NOTES
Please let patient know LDL cholesterol is slightly elevated at 105. Would want this to go down to below 100. He should exercise and take a diet low in polysaturated fats. Rest of the labs remain stable.   Joslyn Moore MD

## 2022-06-06 NOTE — PROGRESS NOTES
Patient's calcium is slightly elevated at 10.5. Her GFR is reduced. This indicates slight reduction in renal function. She should maintain good hydration status. I will recheck her metabolic panel at her next visit.   Rowan Hough MD

## 2022-06-07 ENCOUNTER — TELEPHONE (OUTPATIENT)
Dept: FAMILY MEDICINE CLINIC | Age: 67
End: 2022-06-07

## 2022-06-07 NOTE — PROGRESS NOTES
Attempted to contact patient with no answer. Patient was left a VM. Patient will also have a letter sent out today.

## 2022-06-07 NOTE — TELEPHONE ENCOUNTER
Pt returning call about lab results. Ms. Della Floyd can be reached at [phone]. Please advise.  Thank you!!!

## 2022-08-21 DIAGNOSIS — E78.5 DYSLIPIDEMIA: ICD-10-CM

## 2022-08-21 RX ORDER — GEMFIBROZIL 600 MG/1
TABLET, FILM COATED ORAL
Qty: 180 TABLET | Refills: 0 | Status: SHIPPED | OUTPATIENT
Start: 2022-08-21 | End: 2022-10-12

## 2022-10-12 DIAGNOSIS — F39 MOOD DISORDER (HCC): ICD-10-CM

## 2022-10-12 DIAGNOSIS — E78.5 DYSLIPIDEMIA: ICD-10-CM

## 2022-10-12 RX ORDER — SERTRALINE HYDROCHLORIDE 25 MG/1
TABLET, FILM COATED ORAL
Qty: 90 TABLET | Refills: 2 | Status: SHIPPED | OUTPATIENT
Start: 2022-10-12

## 2022-10-12 RX ORDER — GEMFIBROZIL 600 MG/1
TABLET, FILM COATED ORAL
Qty: 180 TABLET | Refills: 0 | Status: SHIPPED | OUTPATIENT
Start: 2022-10-12

## 2022-10-12 RX ORDER — EZETIMIBE 10 MG/1
TABLET ORAL
Qty: 90 TABLET | Refills: 2 | Status: SHIPPED | OUTPATIENT
Start: 2022-10-12

## 2022-12-01 ENCOUNTER — OFFICE VISIT (OUTPATIENT)
Dept: FAMILY MEDICINE CLINIC | Age: 67
End: 2022-12-01
Payer: MEDICARE

## 2022-12-01 VITALS
RESPIRATION RATE: 17 BRPM | HEART RATE: 94 BPM | DIASTOLIC BLOOD PRESSURE: 70 MMHG | BODY MASS INDEX: 29.37 KG/M2 | TEMPERATURE: 97.8 F | HEIGHT: 64 IN | OXYGEN SATURATION: 97 % | WEIGHT: 172 LBS | SYSTOLIC BLOOD PRESSURE: 110 MMHG

## 2022-12-01 DIAGNOSIS — N18.31 STAGE 3A CHRONIC KIDNEY DISEASE (HCC): ICD-10-CM

## 2022-12-01 DIAGNOSIS — E78.5 DYSLIPIDEMIA: Primary | ICD-10-CM

## 2022-12-01 DIAGNOSIS — F39 MOOD DISORDER (HCC): ICD-10-CM

## 2022-12-01 DIAGNOSIS — Z23 ENCOUNTER FOR IMMUNIZATION: ICD-10-CM

## 2022-12-01 RX ORDER — RALOXIFENE HYDROCHLORIDE 60 MG/1
60 TABLET, FILM COATED ORAL DAILY
COMMUNITY
Start: 2022-10-12

## 2022-12-01 NOTE — PROGRESS NOTES
HPI  Lewie Gottron comes in for follow-up care. Dyslipidemia: Patient has dyslipidemia. She is on Zetia and Lopid. Stable on the medication. We will continue with the current treatment plan. She will continue to exercise and take a diet low in polysaturated fats. Recheck lipid panel at next visit. Mood disorder: Patient has mood disorder. She is on sertraline. Stable on the medication. Continue current treatment plan. CKD: Patient has chronic kidney disease stage III. Plan is to avoid any nephrotoxic medications. Overweight: Patient has a BMI of 29.52. She will intensify lifestyle and dietary modification. Health maintenance: Patient will get the flu vaccine today. Past Medical History  History reviewed. No pertinent past medical history. Surgical History  Past Surgical History:   Procedure Laterality Date    HX HYSTERECTOMY  2009        Medications  Current Outpatient Medications   Medication Sig Dispense Refill    raloxifene (EVISTA) 60 mg tablet Take 60 mg by mouth daily. ezetimibe (ZETIA) 10 mg tablet TAKE 1 TABLET BY MOUTH EVERY DAY 90 Tablet 2    gemfibroziL (LOPID) 600 mg tablet TAKE 1 TABLET BY MOUTH TWICE A  Tablet 0    sertraline (ZOLOFT) 25 mg tablet TAKE 1 TABLET BY MOUTH EVERY DAY 90 Tablet 2    valACYclovir (VALTREX) 500 mg tablet Take  by mouth daily. fish oil-omega-3 fatty acids 340-1,000 mg capsule Take 2 Caps by mouth daily. glucosamine/chondr govea A sod (GLUCOSAMINE-CHONDROITIN) 750-600 mg tab Take 2 Tabs by mouth daily. alendronate (FOSAMAX) 35 mg tablet Take  by mouth every seven (7) days.          Allergies  Allergies   Allergen Reactions    Pcn [Penicillins] Unknown (comments)       Family History  Family History   Problem Relation Age of Onset    Cancer Mother     Glaucoma Mother     Dementia Mother     Breast Cancer Mother     Heart Disease Father     Cancer Maternal Grandmother     Breast Cancer Maternal Grandmother        Social History  Social History     Socioeconomic History    Marital status:      Spouse name: Not on file    Number of children: Not on file    Years of education: Not on file    Highest education level: Not on file   Occupational History    Not on file   Tobacco Use    Smoking status: Some Days     Types: Cigars    Smokeless tobacco: Never   Vaping Use    Vaping Use: Never used   Substance and Sexual Activity    Alcohol use: Yes     Alcohol/week: 1.0 standard drink     Types: 1 Shots of liquor per week     Comment: social    Drug use: No    Sexual activity: Yes   Other Topics Concern    Not on file   Social History Narrative    Not on file     Social Determinants of Health     Financial Resource Strain: Not on file   Food Insecurity: Not on file   Transportation Needs: Not on file   Physical Activity: Not on file   Stress: Not on file   Social Connections: Not on file   Intimate Partner Violence: Not on file   Housing Stability: Not on file       Review of Systems  Review of Systems - Review of all systems is negative except as noted above in the HPI.     Vital Signs  Visit Vitals  /70 (BP 1 Location: Right upper arm, BP Patient Position: Sitting, BP Cuff Size: Adult)   Pulse 94   Temp 97.8 °F (36.6 °C) (Temporal)   Resp 17   Ht 5' 4\" (1.626 m)   Wt 172 lb (78 kg)   SpO2 97%   BMI 29.52 kg/m²         Physical Exam  Physical Examination: General appearance - alert, well appearing, and in no distress, oriented to person, place, and time, and acyanotic, in no respiratory distress  Mental status - alert, oriented to person, place, and time  Lymphatics - no palpable lymphadenopathy, no hepatosplenomegaly  Chest - clear to auscultation, no wheezes, rales or rhonchi, symmetric air entry  Heart - S1 and S2 normal  Abdomen - no rebound tenderness noted  Back exam - limited range of motion  Neurological - motor and sensory grossly normal bilaterally  Musculoskeletal - no muscular tenderness noted  Extremities - no pedal edema noted, intact peripheral pulses      Results  Results for orders placed or performed during the hospital encounter of 06/01/22   LIPID PANEL   Result Value Ref Range    LIPID PROFILE        Cholesterol, total 180 <200 mg/dL    Triglyceride 126 <150 mg/dL    HDL Cholesterol 49 40 - 60 mg/dL    LDL, calculated 105.8 (H) 0 - 100 mg/dL    VLDL, calculated 25.2 mg/dL    CHOL/HDL Ratio 3.7 0 - 5.0     METABOLIC PANEL, COMPREHENSIVE   Result Value Ref Range    Sodium 139 136 - 145 mmol/L    Potassium 4.4 3.5 - 5.5 mmol/L    Chloride 105 100 - 111 mmol/L    CO2 21 21 - 32 mmol/L    Anion gap 13 3.0 - 18.0 mmol/L    Glucose 95 74 - 99 mg/dL    BUN 19 (H) 7 - 18 mg/dL    Creatinine 1.21 0.60 - 1.30 mg/dL    BUN/Creatinine ratio 16 12 - 20      GFR est AA 54 (L) >60 ml/min/1.73m2    GFR est non-AA 45 (L) >60 ml/min/1.73m2    Calcium 10.5 (H) 8.5 - 10.1 mg/dL    Bilirubin, total 0.7 0.2 - 1.0 mg/dL    AST (SGOT) 27 10 - 38 U/L    ALT (SGPT) 46 13 - 56 U/L    Alk. phosphatase 70 45 - 117 U/L    Protein, total 8.3 (H) 6.4 - 8.2 g/dL    Albumin 4.2 3.4 - 5.0 g/dL    Globulin 4.1 (H) 2.0 - 4.0 g/dL    A-G Ratio 1.0 0.8 - 1.7     CBC WITH AUTOMATED DIFF   Result Value Ref Range    WBC 10.5 4.6 - 13.2 K/uL    RBC 4.51 4.20 - 5.30 M/uL    HGB 14.1 12.0 - 16.0 g/dL    HCT 41.4 35.0 - 45.0 %    MCV 91.8 78.0 - 100.0 FL    MCH 31.3 24.0 - 34.0 PG    MCHC 34.1 31.0 - 37.0 g/dL    RDW 12.1 11.6 - 14.5 %    PLATELET 204 387 - 205 K/uL    MPV 11.3 9.2 - 11.8 FL    NRBC 0.0 0.0  WBC    ABSOLUTE NRBC 0.00 0.00 - 0.01 K/uL    NEUTROPHILS 56 40 - 73 %    LYMPHOCYTES 35 21 - 52 %    MONOCYTES 7 3 - 10 %    EOSINOPHILS 1 0 - 5 %    BASOPHILS 0 0 - 2 %    IMMATURE GRANULOCYTES 1 (H) 0 - 0.5 %    ABS. NEUTROPHILS 5.9 1.8 - 8.0 K/UL    ABS. LYMPHOCYTES 3.7 (H) 0.9 - 3.6 K/UL    ABS. MONOCYTES 0.7 0.05 - 1.2 K/UL    ABS. EOSINOPHILS 0.2 0.0 - 0.4 K/UL    ABS. BASOPHILS 0.0 0.0 - 0.1 K/UL    ABS. IMM.  GRANS. 0.1 (H) 0.00 - 0.04 K/UL    DF AUTOMATED         ASSESSMENT and PLAN    ICD-10-CM ICD-9-CM    1. Dyslipidemia  E78.5 272.4       2. Stage 3a chronic kidney disease (HCC)  N18.31 585.3       3. Mood disorder (HCC)  F39 296.90       4. Encounter for immunization  Z23 V03.89 INFLUENZA, FLUAD, (AGE 72 Y+), IM, PF, 0.5 ML        lab results and schedule of future lab studies reviewed with patient  reviewed diet, exercise and weight control  cardiovascular risk and specific lipid/LDL goals reviewed  reviewed medications and side effects in detail      I have discussed the diagnosis with the patient and the intended plan of care as seen in the above orders. The patient has received an after-visit summary and questions were answered concerning future plans. I have discussed medication, side effects, and warnings with the patient in detail. The patient verbalized understanding and is in agreement with the plan of care. The patient will contact the office with any additional concerns. Seth Bentley MD    PLEASE NOTE:   This document has been produced using voice recognition software.  Unrecognized errors in transcription may be present

## 2023-02-08 ENCOUNTER — OFFICE VISIT (OUTPATIENT)
Dept: FAMILY MEDICINE CLINIC | Age: 68
End: 2023-02-08
Payer: MEDICARE

## 2023-02-08 VITALS
DIASTOLIC BLOOD PRESSURE: 67 MMHG | BODY MASS INDEX: 29.37 KG/M2 | HEIGHT: 64 IN | HEART RATE: 82 BPM | TEMPERATURE: 98.3 F | WEIGHT: 172 LBS | RESPIRATION RATE: 18 BRPM | OXYGEN SATURATION: 98 % | SYSTOLIC BLOOD PRESSURE: 133 MMHG

## 2023-02-08 DIAGNOSIS — E78.5 DYSLIPIDEMIA: ICD-10-CM

## 2023-02-08 DIAGNOSIS — Z12.31 SCREENING MAMMOGRAM FOR BREAST CANCER: ICD-10-CM

## 2023-02-08 DIAGNOSIS — Z71.89 ADVANCED DIRECTIVES, COUNSELING/DISCUSSION: ICD-10-CM

## 2023-02-08 DIAGNOSIS — N18.31 STAGE 3A CHRONIC KIDNEY DISEASE (HCC): ICD-10-CM

## 2023-02-08 DIAGNOSIS — F39 MOOD DISORDER (HCC): Primary | ICD-10-CM

## 2023-02-08 DIAGNOSIS — N39.3 STRESS INCONTINENCE: ICD-10-CM

## 2023-02-08 DIAGNOSIS — B00.9 HSV INFECTION: ICD-10-CM

## 2023-02-08 DIAGNOSIS — M85.80 OSTEOPENIA, UNSPECIFIED LOCATION: ICD-10-CM

## 2023-02-08 DIAGNOSIS — D47.3 ESSENTIAL (HEMORRHAGIC) THROMBOCYTHEMIA (HCC): ICD-10-CM

## 2023-02-08 DIAGNOSIS — Z13.31 SCREENING FOR DEPRESSION: ICD-10-CM

## 2023-02-08 DIAGNOSIS — Z00.00 MEDICARE ANNUAL WELLNESS VISIT, SUBSEQUENT: ICD-10-CM

## 2023-02-08 PROCEDURE — G8536 NO DOC ELDER MAL SCRN: HCPCS | Performed by: FAMILY MEDICINE

## 2023-02-08 PROCEDURE — G0444 DEPRESSION SCREEN ANNUAL: HCPCS | Performed by: FAMILY MEDICINE

## 2023-02-08 PROCEDURE — G8427 DOCREV CUR MEDS BY ELIG CLIN: HCPCS | Performed by: FAMILY MEDICINE

## 2023-02-08 PROCEDURE — 1123F ACP DISCUSS/DSCN MKR DOCD: CPT | Performed by: FAMILY MEDICINE

## 2023-02-08 PROCEDURE — G0439 PPPS, SUBSEQ VISIT: HCPCS | Performed by: FAMILY MEDICINE

## 2023-02-08 PROCEDURE — G8417 CALC BMI ABV UP PARAM F/U: HCPCS | Performed by: FAMILY MEDICINE

## 2023-02-08 PROCEDURE — G8510 SCR DEP NEG, NO PLAN REQD: HCPCS | Performed by: FAMILY MEDICINE

## 2023-02-08 PROCEDURE — 1101F PT FALLS ASSESS-DOCD LE1/YR: CPT | Performed by: FAMILY MEDICINE

## 2023-02-08 PROCEDURE — 1090F PRES/ABSN URINE INCON ASSESS: CPT | Performed by: FAMILY MEDICINE

## 2023-02-08 PROCEDURE — 99497 ADVNCD CARE PLAN 30 MIN: CPT | Performed by: FAMILY MEDICINE

## 2023-02-08 PROCEDURE — G8399 PT W/DXA RESULTS DOCUMENT: HCPCS | Performed by: FAMILY MEDICINE

## 2023-02-08 PROCEDURE — 99214 OFFICE O/P EST MOD 30 MIN: CPT | Performed by: FAMILY MEDICINE

## 2023-02-08 PROCEDURE — 3017F COLORECTAL CA SCREEN DOC REV: CPT | Performed by: FAMILY MEDICINE

## 2023-02-08 PROCEDURE — G9899 SCRN MAM PERF RSLTS DOC: HCPCS | Performed by: FAMILY MEDICINE

## 2023-02-08 RX ORDER — RALOXIFENE HYDROCHLORIDE 60 MG/1
60 TABLET, FILM COATED ORAL DAILY
Qty: 90 TABLET | Refills: 1 | Status: SHIPPED | OUTPATIENT
Start: 2023-02-08

## 2023-02-08 RX ORDER — MIRABEGRON 25 MG/1
TABLET, FILM COATED, EXTENDED RELEASE ORAL
COMMUNITY
Start: 2023-02-07 | End: 2023-02-08 | Stop reason: SDUPTHER

## 2023-02-08 RX ORDER — VALACYCLOVIR HYDROCHLORIDE 500 MG/1
500 TABLET, FILM COATED ORAL DAILY
Qty: 90 TABLET | Refills: 1 | Status: SHIPPED | OUTPATIENT
Start: 2023-02-08

## 2023-02-08 RX ORDER — MIRABEGRON 25 MG/1
25 TABLET, FILM COATED, EXTENDED RELEASE ORAL DAILY
Qty: 90 TABLET | Refills: 1 | Status: SHIPPED | OUTPATIENT
Start: 2023-02-08

## 2023-02-08 RX ORDER — RALOXIFENE HYDROCHLORIDE 60 MG/1
TABLET, FILM COATED ORAL
COMMUNITY
Start: 2022-07-01 | End: 2023-02-08 | Stop reason: SDUPTHER

## 2023-02-08 NOTE — PROGRESS NOTES
This is the Subsequent Medicare Annual Wellness Exam, performed 12 months or more after the Initial AWV or the last Subsequent AWV    I have reviewed the patient's medical history in detail and updated the computerized patient record. Assessment/Plan   Education and counseling provided:  Are appropriate based on today's review and evaluation    1. Medicare annual wellness visit, subsequent    2. Screening mammogram for breast cancer  - BE[facility]M MAMMO BI SCREENING INCL CAD; Future    3. Advanced directives, counseling/discussion  - ADVANCE CARE PLANNING FIRST 27 MINS  - FULL CODE    4. Screening for depression  - DEPRESSION SCREEN ANNUAL  - [place] DEPRESSION SCREEN ANNUAL       Depression Risk Factor Screening     3 most recent PHQ Screens 2/8/2023   Little interest or pleasure in doing things Not at all   Feeling down, depressed, irritable, or hopeless Not at all   Total Score PHQ 2 0   Trouble falling or staying asleep, or sleeping too much Not at all   Feeling tired or having little energy Not at all   Poor appetite, weight loss, or overeating Not at all   Feeling bad about yourself - or that you are a failure or have let yourself or your family down Not at all   Trouble concentrating on things such as school, work, reading, or watching TV Not at all   Moving or speaking so slowly that other people could have noticed; or the opposite being so fidgety that others notice Not at all   Thoughts of being better off dead, or hurting yourself in some way Not at all   PHQ 9 Score 0   How difficult have these problems made it for you to do your work, take care of your home and get along with others Not difficult at all   8 minutes taken on depression screening.     Alcohol & Drug Abuse Risk Screen    Do you average more than 1 drink per night or more than 7 drinks a week:  No    On any one occasion in the past three months have you have had more than 3 drinks containing alcohol:  No          Functional Ability and Level of Safety    Hearing: Hearing is good. Activities of Daily Living: The home contains: no safety equipment. Patient does total self care      Ambulation: with no difficulty     Fall Risk:  Fall Risk Assessment, last 12 mths 2/8/2023   Able to walk? Yes   Fall in past 12 months? 0   Do you feel unsteady? 0   Are you worried about falling 0      Abuse Screen:  Patient is not abused       Cognitive Screening    Has your family/caregiver stated any concerns about your memory: no     Cognitive Screening: Normal - Verbal Fluency Test    Health Maintenance Due     Health Maintenance Due   Topic Date Due    Shingles Vaccine (1 of 2) Never done    COVID-19 Vaccine (3 - Booster for Moderna series) 06/10/2021    Medicare Yearly Exam  02/02/2023    Breast Cancer Screen Mammogram  03/02/2023       Patient Care Team   Patient Care Team:  Kayode Rousseau MD as PCP - General (Family Medicine)  Kayode Rousseau MD as PCP - [facility] Empaneled Provider    History   Mary Jo Beck comes in for Medicare wellness exam.    Patient Active Problem List   Diagnosis Code    Overweight (BMI 25.0-29. 9) E66.3    Elevated cholesterol E78.00    Tobacco abuse Z72.0    Seborrheic keratosis L82.1    Skin changes due to chronic exposure to nonionizing radiation L57.9    Viral wart B07.9    Chronic renal disease, stage III N18.30     History reviewed. No pertinent past medical history. Past Surgical History:   Procedure Laterality Date    HX HYSTERECTOMY  2009     Current Outpatient Medications   Medication Sig Dispense Refill    Myrbetriq 25 mg ER tablet       raloxifene (EVISTA) 60 mg tablet       ezetimibe (ZETIA) 10 mg tablet TAKE 1 TABLET BY MOUTH EVERY DAY 90 Tablet 2    gemfibroziL (LOPID) 600 mg tablet TAKE 1 TABLET BY MOUTH TWICE A  Tablet 0    sertraline (ZOLOFT) 25 mg tablet TAKE 1 TABLET BY MOUTH EVERY DAY 90 Tablet 2    valACYclovir (VALTREX) 500 mg tablet Take  by mouth daily.       fish oil-omega-3 fatty acids 340-1,000 mg capsule Take 2 Caps by mouth daily. glucosamine/chondr govea A sod (GLUCOSAMINE-CHONDROITIN) 750-600 mg tab Take 2 Tabs by mouth daily. Allergies   Allergen Reactions    Pcn [Penicillins] Unknown (comments)       Family History   Problem Relation Age of Onset    Cancer Mother     Glaucoma Mother     Dementia Mother     Breast Cancer Mother     Heart Disease Father     Cancer Maternal Grandmother     Breast Cancer Maternal Grandmother      Social History     Tobacco Use    Smoking status: Some Days     Types: Cigars    Smokeless tobacco: Never   Substance Use Topics    Alcohol use: Yes     Alcohol/week: 1.0 standard drink     Types: 1 Shots of liquor per week     Comment: social     I have discussed the diagnosis with the patient and the intended plan of care as seen in the above orders. The patient has received an after-visit summary and questions were answered concerning future plans. I have discussed medication, side effects, and warnings with the patient in detail. The patient verbalized understanding and is in agreement with the plan of care. The patient will contact the office with any additional concerns. Personalized preventative plan of care was discussed, printed and given to patient.     Rachna Mcghee MD

## 2023-02-08 NOTE — PATIENT INSTRUCTIONS
Medicare Wellness Visit, Female     The best way to live healthy is to have a lifestyle where you eat a well-balanced diet, exercise regularly, limit alcohol use, and quit all forms of tobacco/nicotine, if applicable. Regular preventive services are another way to keep healthy. Preventive services (vaccines, screening tests, monitoring & exams) can help personalize your care plan, which helps you manage your own care. Screening tests can find health problems at the earliest stages, when they are easiest to treat. [facility] follows the current, evidence-based guidelines published by the [facility] Kess[facility] ([facility]) when recommending preventive services for our patients. Because we follow these guidelines, sometimes recommendations change over time as research supports it. (For example, mammograms used to be recommended annually. Even though Medicare will still pay for an annual mammogram, the newer guidelines recommend a mammogram every two years for women of average risk). Of course, you and your doctor may decide to screen more often for some diseases, based on your risk and your co-morbidities (chronic disease you are already diagnosed with). Preventive services for you include:  - Medicare offers their members a free annual wellness visit, which is time for you and your primary care provider to discuss and plan for your preventive service needs.  Take advantage of this benefit every year!    -Over the age of 72 should receive the recommended pneumonia vaccines.    -All adults should have a flu vaccine yearly.  -All adults should have a tetanus vaccine every 10 years.   -Over the age 48 should receive the shingles vaccines.        -All adults should be screened once for Hepatitis C.  -All adults age 38-68 who are overweight should have a diabetes screening test once every three years.   -Other screening tests and preventive services for persons with diabetes include: an eye exam to screen for diabetic retinopathy, a kidney function test, a foot exam, and stricter control over your cholesterol.   -Cardiovascular screening for adults with routine risk involves an electrocardiogram (ECG) at intervals determined by your doctor.     -Colorectal cancer screenings should be done for adults age 39-70 with no increased risk factors for colorectal cancer. There are a number of acceptable methods of screening for this type of cancer. Each test has its own benefits and drawbacks. Discuss with your doctor what is most appropriate for you during your annual wellness visit. The different tests include: colonoscopy (considered the best screening method), a fecal occult blood test, a fecal DNA test, and sigmoidoscopy.    -Lung cancer screening is recommended annually with a low dose CT scan for adults between age 54 and 68, who have smoked at least 30 pack years (equivalent of 1 pack per day for 30 days), and who is a current smoker or quit less than 15 years ago.    -A bone mass density test is recommended when a woman turns 65 to screen for osteoporosis. This test is only recommended one time, as a screening. Some providers will use this same test as a disease monitoring tool if you already have osteoporosis. -Breast cancer screenings are recommended every other year for women of normal risk, age 54-69.    -Cervical cancer screenings for women over age 72 are only recommended with certain risk factors.      Here is a list of your current Health Maintenance items (your personalized list of preventive services) with a due date:  Health Maintenance Due   Topic Date Due    Shingles Vaccine (1 of 2) Never done    COVID-19 Vaccine (3 - Booster for Rogelio Dunks series) 06/10/2021    Mammogram  03/02/2023         Medicare Wellness Visit, Female     The best way to live healthy is to have a lifestyle where you eat a well-balanced diet, exercise regularly, limit alcohol use, and quit all forms of tobacco/nicotine, if applicable. Regular preventive services are another way to keep healthy. Preventive services (vaccines, screening tests, monitoring & exams) can help personalize your care plan, which helps you manage your own care. Screening tests can find health problems at the earliest stages, when they are easiest to treat. [facility] follows the current, evidence-based guidelines published by the [facility] ([facility]) when recommending preventive services for our patients. Because we follow these guidelines, sometimes recommendations change over time as research supports it. (For example, mammograms used to be recommended annually. Even though Medicare will still pay for an annual mammogram, the newer guidelines recommend a mammogram every two years for women of average risk). Of course, you and your doctor may decide to screen more often for some diseases, based on your risk and your co-morbidities (chronic disease you are already diagnosed with). Preventive services for you include:  - Medicare offers their members a free annual wellness visit, which is time for you and your primary care provider to discuss and plan for your preventive service needs.  Take advantage of this benefit every year!    -Over the age of 72 should receive the recommended pneumonia vaccines.    -All adults should have a flu vaccine yearly.  -All adults should have a tetanus vaccine every 10 years.   -Over the age 48 should receive the shingles vaccines.        -All adults should be screened once for Hepatitis C.  -All adults age 38-68 who are overweight should have a diabetes screening test once every three years.   -Other screening tests and preventive services for persons with diabetes include: an eye exam to screen for diabetic retinopathy, a kidney function test, a foot exam, and stricter control over your cholesterol.   -Cardiovascular screening for adults with routine risk involves an electrocardiogram (ECG) at intervals determined by your doctor.     -Colorectal cancer screenings should be done for adults age 39-70 with no increased risk factors for colorectal cancer. There are a number of acceptable methods of screening for this type of cancer. Each test has its own benefits and drawbacks. Discuss with your doctor what is most appropriate for you during your annual wellness visit. The different tests include: colonoscopy (considered the best screening method), a fecal occult blood test, a fecal DNA test, and sigmoidoscopy.    -Lung cancer screening is recommended annually with a low dose CT scan for adults between age 54 and 68, who have smoked at least 30 pack years (equivalent of 1 pack per day for 30 days), and who is a current smoker or quit less than 15 years ago.    -A bone mass density test is recommended when a woman turns 65 to screen for osteoporosis. This test is only recommended one time, as a screening. Some providers will use this same test as a disease monitoring tool if you already have osteoporosis. -Breast cancer screenings are recommended every other year for women of normal risk, age 54-69.    -Cervical cancer screenings for women over age 72 are only recommended with certain risk factors.      Here is a list of your current Health Maintenance items (your personalized list of preventive services) with a due date:  Health Maintenance Due   Topic Date Due    Shingles Vaccine (1 of 2) Never done    COVID-19 Vaccine (3 - Booster for Derinda Schroeder series) 06/10/2021    Mammogram  03/02/2023

## 2023-02-08 NOTE — PROGRESS NOTES
JAMAL  Porfirio Bhatia comes in for follow-up care. Dyslipidemia: Patient has dyslipidemia. Patient is on fish oil and gemfibrozil. Patient also on Zetia. She is doing lifestyle and dietary modification. She will take a diet low in polysaturated fats. Continue current treatment plan. We will recheck fasting lipid panel at next visit. Mood disorder: Patient has mood disorder with anxiety. Patient is on sertraline. Stable on medication. Denies suicidal ideation. We will send a refill of medication. Osteopenia: Patient has osteopenia. She is on raloxifene daily. I will send a refill of medication. HSV: Patient has a history of HSV. She takes valacyclovir daily. Prescription will be sent in. Currently stable. Stress incontinence: Patient has history of stress urinary incontinence. Patient is on Myrbetriq. She would like prescription sent into the pharmacy. Denies dysuria or hematuria. Prescription was sent in. Essential thrombocythemia: Patient has history of essential thrombocythemia. We will recheck CBC at next visit. She has been stable. CKD: Patient has history of CKD stage III. We will recheck metabolic panel at next visit. Plan is to avoid nephrotoxic medication. Overweight: Patient has a BMI of 29.52. Patient will intensify lifestyle and dietary modification. Health maintenance: We will order mammogram to screen for breast cancer. Past Medical History  History reviewed. No pertinent past medical history.     Surgical History  Past Surgical History:   Procedure Laterality Date    HX HYSTERECTOMY  2009        Medications  Current Outpatient Medications   Medication Sig Dispense Refill    Myrbetriq 25 mg ER tablet       raloxifene (EVISTA) 60 mg tablet       ezetimibe (ZETIA) 10 mg tablet TAKE 1 TABLET BY MOUTH EVERY DAY 90 Tablet 2    gemfibroziL (LOPID) 600 mg tablet TAKE 1 TABLET BY MOUTH TWICE A  Tablet 0    sertraline (ZOLOFT) 25 mg tablet TAKE 1 TABLET BY MOUTH EVERY DAY 90 Tablet 2    valACYclovir (VALTREX) 500 mg tablet Take  by mouth daily. fish oil-omega-3 fatty acids 340-1,000 mg capsule Take 2 Caps by mouth daily. glucosamine/chondr govea A sod (GLUCOSAMINE-CHONDROITIN) 750-600 mg tab Take 2 Tabs by mouth daily. Allergies  Allergies   Allergen Reactions    Pcn [Penicillins] Unknown (comments)       Family History  Family History   Problem Relation Age of Onset    Cancer Mother     Glaucoma Mother     Dementia Mother     Breast Cancer Mother     Heart Disease Father     Cancer Maternal Grandmother     Breast Cancer Maternal Grandmother        Social History  Social History     Socioeconomic History    Marital status:      Spouse name: Not on file    Number of children: Not on file    Years of education: Not on file    Highest education level: Not on file   Occupational History    Not on file   Tobacco Use    Smoking status: Some Days     Types: Cigars    Smokeless tobacco: Never   Vaping Use    Vaping Use: Never used   Substance and Sexual Activity    Alcohol use: Yes     Alcohol/week: 1.0 standard drink     Types: 1 Shots of liquor per week     Comment: social    Drug use: No    Sexual activity: Yes   Other Topics Concern    Not on file   Social History Narrative    Not on file     Social Determinants of Health     Financial Resource Strain: Not on file   Food Insecurity: Not on file   Transportation Needs: Not on file   Physical Activity: Not on file   Stress: Not on file   Social Connections: Not on file   Intimate Partner Violence: Not on file   Housing Stability: Not on file       Review of Systems  Review of Systems - Review of all systems is negative except as noted above in the HPI.     Vital Signs  Visit Vitals  /67   Pulse 82   Temp 98.3 °F (36.8 °C) (Temporal)   Resp 18   Ht 5' 4\" (1.626 m)   Wt 172 lb (78 kg)   SpO2 98%   BMI 29.52 kg/m²         Physical Exam  Physical Examination: General appearance - alert, well appearing, and in no distress, oriented to person, place, and time, overweight, acyanotic, in no respiratory distress, and well hydrated  Mental status - alert, oriented to person, place, and time  Neck - supple, no significant adenopathy  Lymphatics - no palpable lymphadenopathy, no hepatosplenomegaly  Chest - clear to auscultation, no wheezes, rales or rhonchi, symmetric air entry  Heart - S1 and S2 normal  Abdomen - soft, nontender, nondistended, no masses or organomegaly  Back exam - limited range of motion  Neurological - normal muscle tone, no tremors, strength 5/5  Musculoskeletal - osteoarthritic changes noted in both hands  Extremities - no pedal edema noted, intact peripheral pulses      Results  Results for orders placed or performed during the hospital encounter of 06/01/22   LIPID PANEL   Result Value Ref Range    LIPID PROFILE        Cholesterol, total 180 <200 mg/dL    Triglyceride 126 <150 mg/dL    HDL Cholesterol 49 40 - 60 mg/dL    LDL, calculated 105.8 (H) 0 - 100 mg/dL    VLDL, calculated 25.2 mg/dL    CHOL/HDL Ratio 3.7 0 - 5.0     METABOLIC PANEL, COMPREHENSIVE   Result Value Ref Range    Sodium 139 136 - 145 mmol/L    Potassium 4.4 3.5 - 5.5 mmol/L    Chloride 105 100 - 111 mmol/L    CO2 21 21 - 32 mmol/L    Anion gap 13 3.0 - 18.0 mmol/L    Glucose 95 74 - 99 mg/dL    BUN 19 (H) 7 - 18 mg/dL    Creatinine 1.21 0.60 - 1.30 mg/dL    BUN/Creatinine ratio 16 12 - 20      GFR est AA 54 (L) >60 ml/min/1.73m2    GFR est non-AA 45 (L) >60 ml/min/1.73m2    Calcium 10.5 (H) 8.5 - 10.1 mg/dL    Bilirubin, total 0.7 0.2 - 1.0 mg/dL    AST (SGOT) 27 10 - 38 U/L    ALT (SGPT) 46 13 - 56 U/L    Alk.  phosphatase 70 45 - 117 U/L    Protein, total 8.3 (H) 6.4 - 8.2 g/dL    Albumin 4.2 3.4 - 5.0 g/dL    Globulin 4.1 (H) 2.0 - 4.0 g/dL    A-G Ratio 1.0 0.8 - 1.7     CBC WITH AUTOMATED DIFF   Result Value Ref Range    WBC 10.5 4.6 - 13.2 K/uL    RBC 4.51 4.20 - 5.30 M/uL    HGB 14.1 12.0 - 16.0 g/dL    HCT 41.4 35.0 - 45.0 %    MCV 91.8 78.0 - 100.0 FL    MCH 31.3 24.0 - 34.0 PG    MCHC 34.1 31.0 - 37.0 g/dL    RDW 12.1 11.6 - 14.5 %    PLATELET 534 090 - 984 K/uL    MPV 11.3 9.2 - 11.8 FL    NRBC 0.0 0.0  WBC    ABSOLUTE NRBC 0.00 0.00 - 0.01 K/uL    NEUTROPHILS 56 40 - 73 %    LYMPHOCYTES 35 21 - 52 %    MONOCYTES 7 3 - 10 %    EOSINOPHILS 1 0 - 5 %    BASOPHILS 0 0 - 2 %    IMMATURE GRANULOCYTES 1 (H) 0 - 0.5 %    ABS. NEUTROPHILS 5.9 1.8 - 8.0 K/UL    ABS. LYMPHOCYTES 3.7 (H) 0.9 - 3.6 K/UL    ABS. MONOCYTES 0.7 0.05 - 1.2 K/UL    ABS. EOSINOPHILS 0.2 0.0 - 0.4 K/UL    ABS. BASOPHILS 0.0 0.0 - 0.1 K/UL    ABS. IMM. GRANS. 0.1 (H) 0.00 - 0.04 K/UL    DF AUTOMATED         ASSESSMENT and PLAN    ICD-10-CM ICD-9-CM    1. Mood disorder (HCC)  F39 296.90       2. Essential (hemorrhagic) thrombocythemia ([facility] [address]  D47.3 238.71       3. Stage 3a chronic kidney disease (HCC)  N18.31 585.3       4. Stress incontinence  N39.3 [id number] Myrbetriq 25 mg ER tablet      5. Osteopenia, unspecified location  M85.80 733.90 raloxifene (EVISTA) 60 mg tablet      6. HSV infection  B00.9 054.9 valACYclovir (VALTREX) 500 mg tablet      7. Dyslipidemia  E78.5 272.4       8. Medicare annual wellness visit, subsequent  Z00.00 V70.0       9. Screening mammogram for breast cancer  Z12.31 V76.12 BELE[facility] MAMMO BI SCREENING INCL CAD      10. Advanced directives, counseling/discussion  Z71.89 V65.49 ADVANCE CARE PLANNING FIRST 30 MINS      FULL CODE      11. Screening for depression  Z13.31 V79.0 Nelly 72        lab results and schedule of future lab studies reviewed with patient  reviewed diet, exercise and weight control  cardiovascular risk and specific lipid/LDL goals reviewed  reviewed medications and side effects in detail      I have discussed the diagnosis with the patient and the intended plan of care as seen in the above orders.  The patient has received an after-visit summary and questions were answered concerning future plans. I have discussed medication, side effects, and warnings with the patient in detail. The patient verbalized understanding and is in agreement with the plan of care. The patient will contact the office with any additional concerns. Venancio Lopez MD    PLEASE NOTE:   This document has been produced using voice recognition software.  Unrecognized errors in transcription may be present

## 2023-02-08 NOTE — ACP (ADVANCE CARE PLANNING)
Advance Care Planning     Advance Care Planning (ACP) Physician/NP/PA Conversation      Date of Conversation: 2/8/2023  Conducted with: Patient with Decision Making Capacity    Healthcare Decision Maker:     Primary Decision Maker (Active): Landon Rodriguez - [phone]  Click here to complete [address] including selection of the Healthcare Decision Maker Relationship (ie \"Primary\")    Today we documented Decision Maker(s). The patient will provide ACP documents. Care Preferences:    Hospitalization: \"If your health worsens and it becomes clear that your chance of recovery is unlikely, what would be your preference regarding hospitalization? \"  The patient would prefer hospitalization. Ventilation: \"If you were unable to breathe on your own and your chance of recovery was unlikely, what would be your preference about the use of a ventilator (breathing machine) if it was available to you? \"   The patient would desire the use of a ventilator. Resuscitation: \"In the event your heart stopped as a result of an underlying serious health condition, would you want attempts to be made to restart your heart, or would you prefer a natural death? \"   Yes, attempt to resuscitate.     Additional topics discussed: treatment goals, ventilation preferences, hospitalization preferences, and resuscitation preferences    Conversation Outcomes / Follow-Up Plan:   ACP in process - completing/providing documents   Reviewed DNR/DNI and patient elects Full Code (Attempt Resuscitation)     Length of Voluntary ACP Conversation in minutes:  16 minutes    Riley Mehta MD

## 2023-02-27 DIAGNOSIS — E78.5 HYPERLIPIDEMIA, UNSPECIFIED: ICD-10-CM

## 2023-02-28 RX ORDER — GEMFIBROZIL 600 MG/1
TABLET, FILM COATED ORAL
Qty: 180 TABLET | Refills: 1 | Status: SHIPPED | OUTPATIENT
Start: 2023-02-28

## 2023-03-06 ENCOUNTER — HOSPITAL ENCOUNTER (OUTPATIENT)
Age: 68
Discharge: HOME | End: 2023-03-09
Payer: MEDICARE

## 2023-03-06 VITALS — BODY MASS INDEX: 29.37 KG/M2 | HEIGHT: 64 IN | WEIGHT: 172 LBS

## 2023-03-06 DIAGNOSIS — Z12.31 SCREENING MAMMOGRAM FOR HIGH-RISK PATIENT: ICD-10-CM

## 2023-03-06 PROCEDURE — 77067 SCR MAMMO BI INCL CAD: CPT

## 2023-06-05 SDOH — ECONOMIC STABILITY: FOOD INSECURITY: WITHIN THE PAST 12 MONTHS, YOU WORRIED THAT YOUR FOOD WOULD RUN OUT BEFORE YOU GOT MONEY TO BUY MORE.: NEVER TRUE

## 2023-06-05 SDOH — ECONOMIC STABILITY: INCOME INSECURITY: HOW HARD IS IT FOR YOU TO PAY FOR THE VERY BASICS LIKE FOOD, HOUSING, MEDICAL CARE, AND HEATING?: NOT HARD AT ALL

## 2023-06-05 SDOH — ECONOMIC STABILITY: FOOD INSECURITY: WITHIN THE PAST 12 MONTHS, THE FOOD YOU BOUGHT JUST DIDN'T LAST AND YOU DIDN'T HAVE MONEY TO GET MORE.: NEVER TRUE

## 2023-06-05 SDOH — ECONOMIC STABILITY: HOUSING INSECURITY
IN THE LAST 12 MONTHS, WAS THERE A TIME WHEN YOU DID NOT HAVE A STEADY PLACE TO SLEEP OR SLEPT IN A SHELTER (INCLUDING NOW)?: NO

## 2023-06-05 SDOH — ECONOMIC STABILITY: TRANSPORTATION INSECURITY
IN THE PAST 12 MONTHS, HAS LACK OF TRANSPORTATION KEPT YOU FROM MEETINGS, WORK, OR FROM GETTING THINGS NEEDED FOR DAILY LIVING?: NO

## 2023-06-08 ENCOUNTER — HOSPITAL ENCOUNTER (OUTPATIENT)
Age: 68
Discharge: HOME | End: 2023-06-08
Payer: MEDICARE

## 2023-06-08 ENCOUNTER — OFFICE VISIT (OUTPATIENT)
Facility: CLINIC | Age: 68
End: 2023-06-08
Payer: MEDICARE

## 2023-06-08 VITALS
HEIGHT: 64 IN | SYSTOLIC BLOOD PRESSURE: 117 MMHG | RESPIRATION RATE: 18 BRPM | WEIGHT: 169 LBS | DIASTOLIC BLOOD PRESSURE: 73 MMHG | OXYGEN SATURATION: 97 % | TEMPERATURE: 98.4 F | HEART RATE: 86 BPM | BODY MASS INDEX: 28.85 KG/M2

## 2023-06-08 DIAGNOSIS — E78.5 HYPERLIPIDEMIA, UNSPECIFIED HYPERLIPIDEMIA TYPE: ICD-10-CM

## 2023-06-08 DIAGNOSIS — M81.0 OSTEOPOROSIS, UNSPECIFIED OSTEOPOROSIS TYPE, UNSPECIFIED PATHOLOGICAL FRACTURE PRESENCE: ICD-10-CM

## 2023-06-08 DIAGNOSIS — L98.9 SKIN LESION: Primary | ICD-10-CM

## 2023-06-08 DIAGNOSIS — N39.3 STRESS INCONTINENCE (FEMALE) (MALE): ICD-10-CM

## 2023-06-08 DIAGNOSIS — R73.9 HYPERGLYCEMIA: ICD-10-CM

## 2023-06-08 DIAGNOSIS — N18.31 CHRONIC KIDNEY DISEASE, STAGE 3A (HCC): ICD-10-CM

## 2023-06-08 DIAGNOSIS — F39 UNSPECIFIED MOOD (AFFECTIVE) DISORDER (HCC): ICD-10-CM

## 2023-06-08 LAB
ALBUMIN SERPL-MCNC: 4.3 G/DL (ref 3.4–5)
ALBUMIN/GLOB SERPL: 1.2 (ref 0.8–1.7)
ALP SERPL-CCNC: 69 U/L (ref 45–117)
ALT SERPL-CCNC: 45 U/L (ref 13–56)
ANION GAP SERPL CALC-SCNC: 7 MMOL/L (ref 3–18)
AST SERPL W P-5'-P-CCNC: 23 U/L (ref 10–38)
BASOPHILS # BLD: 0 K/UL (ref 0–0.1)
BASOPHILS NFR BLD: 0 % (ref 0–2)
BILIRUB SERPL-MCNC: 0.8 MG/DL (ref 0.2–1)
BUN SERPL-MCNC: 15 MG/DL (ref 7–18)
BUN/CREAT SERPL: 13 (ref 12–20)
CA-I BLD-MCNC: 9.8 MG/DL (ref 8.5–10.1)
CHLORIDE SERPL-SCNC: 107 MMOL/L (ref 100–111)
CO2 SERPL-SCNC: 26 MMOL/L (ref 21–32)
CREAT SERPL-MCNC: 1.2 MG/DL (ref 0.6–1.3)
DIFFERENTIAL METHOD BLD: ABNORMAL
EOSINOPHIL # BLD: 0.3 K/UL (ref 0–0.4)
EOSINOPHIL NFR BLD: 3 % (ref 0–5)
ERYTHROCYTE [DISTWIDTH] IN BLOOD BY AUTOMATED COUNT: 12.6 % (ref 11.6–14.5)
GLOBULIN SER CALC-MCNC: 3.7 G/DL (ref 2–4)
GLUCOSE SERPL-MCNC: 75 MG/DL (ref 74–99)
HCT VFR BLD AUTO: 43.5 % (ref 35–45)
HGB BLD-MCNC: 14.4 G/DL (ref 12–16)
IMM GRANULOCYTES # BLD AUTO: 0.1 K/UL (ref 0–0.04)
IMM GRANULOCYTES NFR BLD AUTO: 1 % (ref 0–0.5)
LYMPHOCYTES # BLD: 3.7 K/UL (ref 0.9–3.6)
LYMPHOCYTES NFR BLD: 37 % (ref 21–52)
MCH RBC QN AUTO: 31.5 PG (ref 24–34)
MCHC RBC AUTO-ENTMCNC: 33.1 G/DL (ref 31–37)
MCV RBC AUTO: 95.2 FL (ref 78–100)
MONOCYTES # BLD: 0.6 K/UL (ref 0.05–1.2)
MONOCYTES NFR BLD: 6 % (ref 3–10)
NEUTS SEG # BLD: 5.3 K/UL (ref 1.8–8)
NEUTS SEG NFR BLD: 53 % (ref 40–73)
NRBC # BLD: 0 K/UL (ref 0–0.01)
NRBC BLD-RTO: 0 PER 100 WBC
PLATELET # BLD AUTO: 371 K/UL (ref 135–420)
PMV BLD AUTO: 11.3 FL (ref 9.2–11.8)
POTASSIUM SERPL-SCNC: 4 MMOL/L (ref 3.5–5.5)
PROT SERPL-MCNC: 8 G/DL (ref 6.4–8.2)
RBC # BLD AUTO: 4.57 M/UL (ref 4.2–5.3)
SODIUM SERPL-SCNC: 140 MMOL/L (ref 136–145)
WBC # BLD AUTO: 10 K/UL (ref 4.6–13.2)

## 2023-06-08 PROCEDURE — 83036 HEMOGLOBIN GLYCOSYLATED A1C: CPT

## 2023-06-08 PROCEDURE — G8427 DOCREV CUR MEDS BY ELIG CLIN: HCPCS | Performed by: FAMILY MEDICINE

## 2023-06-08 PROCEDURE — 1123F ACP DISCUSS/DSCN MKR DOCD: CPT | Performed by: FAMILY MEDICINE

## 2023-06-08 PROCEDURE — 80053 COMPREHEN METABOLIC PANEL: CPT

## 2023-06-08 PROCEDURE — 36415 COLL VENOUS BLD VENIPUNCTURE: CPT

## 2023-06-08 PROCEDURE — 99214 OFFICE O/P EST MOD 30 MIN: CPT | Performed by: FAMILY MEDICINE

## 2023-06-08 PROCEDURE — 3017F COLORECTAL CA SCREEN DOC REV: CPT | Performed by: FAMILY MEDICINE

## 2023-06-08 PROCEDURE — 1090F PRES/ABSN URINE INCON ASSESS: CPT | Performed by: FAMILY MEDICINE

## 2023-06-08 PROCEDURE — 4004F PT TOBACCO SCREEN RCVD TLK: CPT | Performed by: FAMILY MEDICINE

## 2023-06-08 PROCEDURE — G8419 CALC BMI OUT NRM PARAM NOF/U: HCPCS | Performed by: FAMILY MEDICINE

## 2023-06-08 PROCEDURE — G8399 PT W/DXA RESULTS DOCUMENT: HCPCS | Performed by: FAMILY MEDICINE

## 2023-06-08 PROCEDURE — 85025 COMPLETE CBC W/AUTO DIFF WBC: CPT

## 2023-06-08 PROCEDURE — 80061 LIPID PANEL: CPT

## 2023-06-08 RX ORDER — MIRABEGRON 25 MG/1
25 TABLET, FILM COATED, EXTENDED RELEASE ORAL DAILY
COMMUNITY
Start: 2023-04-29

## 2023-06-08 SDOH — ECONOMIC STABILITY: INCOME INSECURITY: HOW HARD IS IT FOR YOU TO PAY FOR THE VERY BASICS LIKE FOOD, HOUSING, MEDICAL CARE, AND HEATING?: NOT VERY HARD

## 2023-06-08 SDOH — ECONOMIC STABILITY: FOOD INSECURITY: WITHIN THE PAST 12 MONTHS, YOU WORRIED THAT YOUR FOOD WOULD RUN OUT BEFORE YOU GOT MONEY TO BUY MORE.: NEVER TRUE

## 2023-06-08 SDOH — ECONOMIC STABILITY: FOOD INSECURITY: WITHIN THE PAST 12 MONTHS, THE FOOD YOU BOUGHT JUST DIDN'T LAST AND YOU DIDN'T HAVE MONEY TO GET MORE.: NEVER TRUE

## 2023-06-08 ASSESSMENT — PATIENT HEALTH QUESTIONNAIRE - PHQ9
2. FEELING DOWN, DEPRESSED OR HOPELESS: 0
SUM OF ALL RESPONSES TO PHQ9 QUESTIONS 1 & 2: 0
SUM OF ALL RESPONSES TO PHQ QUESTIONS 1-9: 0
SUM OF ALL RESPONSES TO PHQ QUESTIONS 1-9: 0
1. LITTLE INTEREST OR PLEASURE IN DOING THINGS: 0
SUM OF ALL RESPONSES TO PHQ QUESTIONS 1-9: 0
SUM OF ALL RESPONSES TO PHQ QUESTIONS 1-9: 0

## 2023-06-08 NOTE — PROGRESS NOTES
1. \"Have you been to the ER, urgent care clinic since your last visit? Hospitalized since your last visit? \"No    2. \"Have you seen or consulted any other health care providers outside of the [address] [place] [address] since your last visit? \" No    3. For patients aged 39-70: Has the patient had a colonoscopy / FIT/ Cologuard? Yes      If the patient is female:    4. For patients aged 41-77: Has the patient had a mammogram within the past 2 years? Yes      5. For patients aged 21-65: Has the patient had a pap smear?  Not applicable
understanding and is in agreement with the plan of care. The patient will contact the office with any additional concerns. Demian Mckeon MD    PLEASE NOTE:   This document has been produced using voice recognition software.  Unrecognized errors in transcription may be present

## 2023-06-09 LAB
CHOLEST SERPL-MCNC: 195 MG/DL
EST. AVERAGE GLUCOSE BLD GHB EST-MCNC: 111 MG/DL
HBA1C MFR BLD: 5.5 % (ref 4.2–5.6)
HDLC SERPL-MCNC: 54 MG/DL (ref 40–60)
HDLC SERPL: 3.6 (ref 0–5)
LDLC SERPL CALC-MCNC: 98 MG/DL (ref 0–100)
LIPID PANEL: ABNORMAL
TRIGL SERPL-MCNC: 215 MG/DL
VLDLC SERPL CALC-MCNC: 43 MG/DL

## 2023-07-17 DIAGNOSIS — B00.9 HERPESVIRAL INFECTION, UNSPECIFIED: ICD-10-CM

## 2023-07-17 DIAGNOSIS — E78.5 HYPERLIPIDEMIA, UNSPECIFIED: ICD-10-CM

## 2023-07-17 DIAGNOSIS — F39 UNSPECIFIED MOOD (AFFECTIVE) DISORDER (HCC): ICD-10-CM

## 2023-07-17 RX ORDER — SERTRALINE HYDROCHLORIDE 25 MG/1
TABLET, FILM COATED ORAL
Qty: 90 TABLET | Refills: 2 | Status: SHIPPED | OUTPATIENT
Start: 2023-07-17

## 2023-07-17 RX ORDER — GEMFIBROZIL 600 MG/1
TABLET, FILM COATED ORAL
Qty: 180 TABLET | Refills: 1 | Status: SHIPPED | OUTPATIENT
Start: 2023-07-17

## 2023-07-17 RX ORDER — EZETIMIBE 10 MG/1
TABLET ORAL
Qty: 90 TABLET | Refills: 2 | Status: SHIPPED | OUTPATIENT
Start: 2023-07-17

## 2023-07-17 RX ORDER — VALACYCLOVIR HYDROCHLORIDE 500 MG/1
TABLET, FILM COATED ORAL
Qty: 90 TABLET | Refills: 1 | Status: SHIPPED | OUTPATIENT
Start: 2023-07-17

## 2023-08-18 DIAGNOSIS — M85.80 OTHER SPECIFIED DISORDERS OF BONE DENSITY AND STRUCTURE, UNSPECIFIED SITE: ICD-10-CM

## 2023-08-21 RX ORDER — RALOXIFENE HYDROCHLORIDE 60 MG/1
TABLET, FILM COATED ORAL
Qty: 90 TABLET | Refills: 1 | Status: SHIPPED | OUTPATIENT
Start: 2023-08-21

## 2023-11-22 DIAGNOSIS — N39.3 STRESS INCONTINENCE (FEMALE) (MALE): ICD-10-CM

## 2023-11-26 RX ORDER — MIRABEGRON 25 MG/1
25 TABLET, FILM COATED, EXTENDED RELEASE ORAL DAILY
Qty: 90 TABLET | Refills: 1 | Status: SHIPPED | OUTPATIENT
Start: 2023-11-26

## 2024-02-07 SDOH — HEALTH STABILITY: PHYSICAL HEALTH: ON AVERAGE, HOW MANY DAYS PER WEEK DO YOU ENGAGE IN MODERATE TO STRENUOUS EXERCISE (LIKE A BRISK WALK)?: 3 DAYS

## 2024-02-07 SDOH — HEALTH STABILITY: PHYSICAL HEALTH: ON AVERAGE, HOW MANY MINUTES DO YOU ENGAGE IN EXERCISE AT THIS LEVEL?: 60 MIN

## 2024-02-07 ASSESSMENT — LIFESTYLE VARIABLES
HOW OFTEN DO YOU HAVE A DRINK CONTAINING ALCOHOL: 3
HOW OFTEN DO YOU HAVE A DRINK CONTAINING ALCOHOL: 2-4 TIMES A MONTH
HOW MANY STANDARD DRINKS CONTAINING ALCOHOL DO YOU HAVE ON A TYPICAL DAY: 1
HOW OFTEN DO YOU HAVE SIX OR MORE DRINKS ON ONE OCCASION: 1
HOW MANY STANDARD DRINKS CONTAINING ALCOHOL DO YOU HAVE ON A TYPICAL DAY: 1 OR 2

## 2024-02-07 ASSESSMENT — PATIENT HEALTH QUESTIONNAIRE - PHQ9
SUM OF ALL RESPONSES TO PHQ QUESTIONS 1-9: 0
2. FEELING DOWN, DEPRESSED OR HOPELESS: 0
SUM OF ALL RESPONSES TO PHQ QUESTIONS 1-9: 0
1. LITTLE INTEREST OR PLEASURE IN DOING THINGS: 0
SUM OF ALL RESPONSES TO PHQ QUESTIONS 1-9: 0
SUM OF ALL RESPONSES TO PHQ QUESTIONS 1-9: 0
SUM OF ALL RESPONSES TO PHQ9 QUESTIONS 1 & 2: 0

## 2024-02-08 ENCOUNTER — OFFICE VISIT (OUTPATIENT)
Facility: CLINIC | Age: 69
End: 2024-02-08
Payer: MEDICARE

## 2024-02-08 ENCOUNTER — HOSPITAL ENCOUNTER (OUTPATIENT)
Age: 69
Discharge: HOME | End: 2024-02-08
Payer: MEDICARE

## 2024-02-08 VITALS
RESPIRATION RATE: 20 BRPM | OXYGEN SATURATION: 99 % | HEIGHT: 64 IN | SYSTOLIC BLOOD PRESSURE: 130 MMHG | TEMPERATURE: 97.7 F | DIASTOLIC BLOOD PRESSURE: 58 MMHG | HEART RATE: 84 BPM | BODY MASS INDEX: 29.37 KG/M2 | WEIGHT: 172 LBS

## 2024-02-08 DIAGNOSIS — E78.5 HYPERLIPIDEMIA, UNSPECIFIED HYPERLIPIDEMIA TYPE: ICD-10-CM

## 2024-02-08 DIAGNOSIS — Z12.31 ENCOUNTER FOR SCREENING MAMMOGRAM FOR MALIGNANT NEOPLASM OF BREAST: ICD-10-CM

## 2024-02-08 DIAGNOSIS — F39 UNSPECIFIED MOOD (AFFECTIVE) DISORDER (HCC): ICD-10-CM

## 2024-02-08 DIAGNOSIS — N18.31 CHRONIC KIDNEY DISEASE, STAGE 3A (HCC): ICD-10-CM

## 2024-02-08 DIAGNOSIS — Z00.00 MEDICARE ANNUAL WELLNESS VISIT, SUBSEQUENT: Primary | ICD-10-CM

## 2024-02-08 DIAGNOSIS — D47.3 ESSENTIAL (HEMORRHAGIC) THROMBOCYTHEMIA (HCC): ICD-10-CM

## 2024-02-08 DIAGNOSIS — R73.9 HYPERGLYCEMIA: ICD-10-CM

## 2024-02-08 DIAGNOSIS — F33.1 MAJOR DEPRESSIVE DISORDER, RECURRENT, MODERATE (HCC): ICD-10-CM

## 2024-02-08 PROBLEM — F33.0 MAJOR DEPRESSIVE DISORDER, RECURRENT, MILD (HCC): Status: ACTIVE | Noted: 2024-02-08

## 2024-02-08 PROBLEM — F33.9 MAJOR DEPRESSIVE DISORDER, RECURRENT, UNSPECIFIED (HCC): Status: ACTIVE | Noted: 2024-02-08

## 2024-02-08 LAB
ALBUMIN SERPL-MCNC: 4.1 G/DL (ref 3.4–5)
ALBUMIN/GLOB SERPL: 1.1 (ref 0.8–1.7)
ALP SERPL-CCNC: 64 U/L (ref 45–117)
ALT SERPL-CCNC: 46 U/L (ref 13–56)
ANION GAP SERPL CALC-SCNC: 7 MMOL/L (ref 3–18)
AST SERPL W P-5'-P-CCNC: 22 U/L (ref 10–38)
BASOPHILS # BLD: 0 K/UL (ref 0–0.1)
BASOPHILS NFR BLD: 1 % (ref 0–2)
BILIRUB SERPL-MCNC: 0.7 MG/DL (ref 0.2–1)
BUN SERPL-MCNC: 18 MG/DL (ref 7–18)
BUN/CREAT SERPL: 17 (ref 12–20)
CA-I BLD-MCNC: 9.8 MG/DL (ref 8.5–10.1)
CHLORIDE SERPL-SCNC: 110 MMOL/L (ref 100–111)
CHOLEST SERPL-MCNC: 176 MG/DL
CO2 SERPL-SCNC: 24 MMOL/L (ref 21–32)
CREAT SERPL-MCNC: 1.08 MG/DL (ref 0.6–1.3)
DIFFERENTIAL METHOD BLD: ABNORMAL
EOSINOPHIL # BLD: 0.4 K/UL (ref 0–0.4)
EOSINOPHIL NFR BLD: 5 % (ref 0–5)
ERYTHROCYTE [DISTWIDTH] IN BLOOD BY AUTOMATED COUNT: 12.3 % (ref 11.6–14.5)
EST. AVERAGE GLUCOSE BLD GHB EST-MCNC: 114 MG/DL
GLOBULIN SER CALC-MCNC: 3.7 G/DL (ref 2–4)
GLUCOSE SERPL-MCNC: 89 MG/DL (ref 74–99)
HBA1C MFR BLD: 5.6 % (ref 4.2–5.6)
HCT VFR BLD AUTO: 41.6 % (ref 35–45)
HDLC SERPL-MCNC: 48 MG/DL (ref 40–60)
HDLC SERPL: 3.7 (ref 0–5)
HGB BLD-MCNC: 14 G/DL (ref 12–16)
IMM GRANULOCYTES # BLD AUTO: 0 K/UL (ref 0–0.04)
IMM GRANULOCYTES NFR BLD AUTO: 1 % (ref 0–0.5)
LDLC SERPL CALC-MCNC: 93 MG/DL (ref 0–100)
LIPID PANEL: ABNORMAL
LYMPHOCYTES # BLD: 3.4 K/UL (ref 0.9–3.6)
LYMPHOCYTES NFR BLD: 39 % (ref 21–52)
MCH RBC QN AUTO: 32 PG (ref 24–34)
MCHC RBC AUTO-ENTMCNC: 33.7 G/DL (ref 31–37)
MCV RBC AUTO: 95 FL (ref 78–100)
MONOCYTES # BLD: 0.5 K/UL (ref 0.05–1.2)
MONOCYTES NFR BLD: 6 % (ref 3–10)
NEUTS SEG # BLD: 4.2 K/UL (ref 1.8–8)
NEUTS SEG NFR BLD: 48 % (ref 40–73)
NRBC # BLD: 0 K/UL (ref 0–0.01)
NRBC BLD-RTO: 0 PER 100 WBC
PLATELET # BLD AUTO: 225 K/UL (ref 135–420)
PMV BLD AUTO: 12.1 FL (ref 9.2–11.8)
POTASSIUM SERPL-SCNC: 4.1 MMOL/L (ref 3.5–5.5)
PROT SERPL-MCNC: 7.8 G/DL (ref 6.4–8.2)
RBC # BLD AUTO: 4.38 M/UL (ref 4.2–5.3)
SODIUM SERPL-SCNC: 141 MMOL/L (ref 136–145)
TRIGL SERPL-MCNC: 175 MG/DL
VLDLC SERPL CALC-MCNC: 35 MG/DL
WBC # BLD AUTO: 8.6 K/UL (ref 4.6–13.2)

## 2024-02-08 PROCEDURE — G0439 PPPS, SUBSEQ VISIT: HCPCS | Performed by: FAMILY MEDICINE

## 2024-02-08 PROCEDURE — 3017F COLORECTAL CA SCREEN DOC REV: CPT | Performed by: FAMILY MEDICINE

## 2024-02-08 PROCEDURE — 83036 HEMOGLOBIN GLYCOSYLATED A1C: CPT

## 2024-02-08 PROCEDURE — 36415 COLL VENOUS BLD VENIPUNCTURE: CPT

## 2024-02-08 PROCEDURE — 1123F ACP DISCUSS/DSCN MKR DOCD: CPT | Performed by: FAMILY MEDICINE

## 2024-02-08 PROCEDURE — G8484 FLU IMMUNIZE NO ADMIN: HCPCS | Performed by: FAMILY MEDICINE

## 2024-02-08 PROCEDURE — 85025 COMPLETE CBC W/AUTO DIFF WBC: CPT

## 2024-02-08 PROCEDURE — 80053 COMPREHEN METABOLIC PANEL: CPT

## 2024-02-08 PROCEDURE — 80061 LIPID PANEL: CPT

## 2024-02-08 RX ORDER — MELOXICAM 15 MG/1
TABLET ORAL
COMMUNITY
Start: 2024-01-19 | End: 2024-02-08

## 2024-02-08 ASSESSMENT — PATIENT HEALTH QUESTIONNAIRE - PHQ9
SUM OF ALL RESPONSES TO PHQ QUESTIONS 1-9: 0
SUM OF ALL RESPONSES TO PHQ9 QUESTIONS 1 & 2: 0
2. FEELING DOWN, DEPRESSED OR HOPELESS: 0
SUM OF ALL RESPONSES TO PHQ QUESTIONS 1-9: 0
1. LITTLE INTEREST OR PLEASURE IN DOING THINGS: 0

## 2024-02-08 ASSESSMENT — LIFESTYLE VARIABLES
HOW OFTEN DO YOU HAVE A DRINK CONTAINING ALCOHOL: MONTHLY OR LESS
HOW MANY STANDARD DRINKS CONTAINING ALCOHOL DO YOU HAVE ON A TYPICAL DAY: 1 OR 2

## 2024-02-08 NOTE — PROGRESS NOTES
\"Have you been to the ER, urgent care clinic since your last visit?  Hospitalized since your last visit?\"    NO    “Have you seen or consulted any other health care providers outside of [facility] since your last visit?”    NO

## 2024-02-08 NOTE — PROGRESS NOTES
Medicare Annual Wellness Visit    Jennifer Merida is here for Medicare AWV    Assessment & Plan    Diagnosis Orders   1. Medicare annual wellness visit, subsequent        2. Encounter for screening mammogram for malignant neoplasm of breast  [facility] DIGITAL SCREEN W OR WO CAD BILATERAL      3. Unspecified mood (affective) disorder (HCC)        4. Essential (hemorrhagic) thrombocythemia (HCC)  CBC with Auto Differential      5. Chronic kidney disease, stage 3a (HCC)  [facility] - Kristel Kurtz MD, Nephrology, [place]      6. Hyperlipidemia, unspecified hyperlipidemia type  Lipid Panel    Comprehensive Metabolic Panel      7. Hyperglycemia  Hemoglobin A1C      8. Major depressive disorder, recurrent, moderate (HCC)          Recommendations for Preventive Services Due: see orders and patient instructions/AVS.  Recommended screening schedule for the next 5-10 years is provided to the patient in written form: see Patient Instructions/AVS.     Return in about 6 months (around 8/8/2024), or if symptoms worsen or fail to improve, for mood disorder, dyslipidemia.     Subjective   Jennifer Merida comes in for Medicare wellness exam.  Mood disorder: Patient has mood disorder.  She has anxiety and depression.  She is on sertraline.  Stable on medication.  Will continue current treatment plan.  CKD: Patient has chronic kidney disease stage IIIa.  Plan is to avoid nephrotoxic medications.  Will recheck labs.  Will refer her to nephrology for evaluation and management.  Dyslipidemia: Patient has dyslipidemia.  She is on Zetia and gemfibrozil.  Will recheck lipid panel.  I will follow-up with the results.  She will exercise and take a diet low in polysaturated fats.  Thrombocythemia: Patient has a history of essential thrombocythemia.  I will recheck CBC.  I will follow-up with the results.  OAB: Patient has a history of overactive bladder in the urinary incontinence.  She has been followed up with a urologist.  She is on Myrbetriq.

## 2024-02-22 DIAGNOSIS — B00.9 HERPESVIRAL INFECTION, UNSPECIFIED: ICD-10-CM

## 2024-02-22 RX ORDER — VALACYCLOVIR HYDROCHLORIDE 500 MG/1
TABLET, FILM COATED ORAL
Qty: 90 TABLET | Refills: 1 | Status: SHIPPED | OUTPATIENT
Start: 2024-02-22

## 2024-02-23 DIAGNOSIS — M85.80 OTHER SPECIFIED DISORDERS OF BONE DENSITY AND STRUCTURE, UNSPECIFIED SITE: ICD-10-CM

## 2024-02-25 RX ORDER — RALOXIFENE HYDROCHLORIDE 60 MG/1
TABLET, FILM COATED ORAL
Qty: 90 TABLET | Refills: 1 | Status: SHIPPED | OUTPATIENT
Start: 2024-02-25

## 2024-03-13 ENCOUNTER — HOSPITAL ENCOUNTER (OUTPATIENT)
Age: 69
Discharge: HOME | End: 2024-03-16
Payer: MEDICARE

## 2024-03-13 VITALS — HEIGHT: 64 IN | WEIGHT: 165 LBS | BODY MASS INDEX: 28.17 KG/M2

## 2024-03-13 DIAGNOSIS — Z12.31 ENCOUNTER FOR SCREENING MAMMOGRAM FOR MALIGNANT NEOPLASM OF BREAST: ICD-10-CM

## 2024-03-13 PROCEDURE — 77063 BREAST TOMOSYNTHESIS BI: CPT

## 2024-05-09 DIAGNOSIS — E78.5 HYPERLIPIDEMIA, UNSPECIFIED: ICD-10-CM

## 2024-05-09 DIAGNOSIS — F39 UNSPECIFIED MOOD (AFFECTIVE) DISORDER (HCC): ICD-10-CM

## 2024-05-09 RX ORDER — SERTRALINE HYDROCHLORIDE 25 MG/1
TABLET, FILM COATED ORAL
Qty: 90 TABLET | Refills: 2 | Status: SHIPPED | OUTPATIENT
Start: 2024-05-09

## 2024-05-09 RX ORDER — EZETIMIBE 10 MG/1
TABLET ORAL
Qty: 90 TABLET | Refills: 2 | Status: SHIPPED | OUTPATIENT
Start: 2024-05-09

## 2024-05-09 RX ORDER — GEMFIBROZIL 600 MG/1
TABLET, FILM COATED ORAL
Qty: 180 TABLET | Refills: 1 | Status: SHIPPED | OUTPATIENT
Start: 2024-05-09

## 2024-05-21 DIAGNOSIS — N39.3 STRESS INCONTINENCE (FEMALE) (MALE): ICD-10-CM

## 2024-05-21 NOTE — TELEPHONE ENCOUNTER
Last seen 2/8/2024   Last labs 02/08/2024  Last filled  11/26/2023  Next appointment 8/8/2024     Lab Results   Component Value Date     02/08/2024    K 4.1 02/08/2024     02/08/2024    CO2 24 02/08/2024    BUN 18 02/08/2024    CREATININE 1.08 02/08/2024    GLUCOSE 89 02/08/2024    CALCIUM 9.8 02/08/2024    BILITOT 0.7 02/08/2024    ALKPHOS 64 02/08/2024    AST 22 02/08/2024    ALT 46 02/08/2024    LABGLOM 56 (L) 02/08/2024    GFRAA 54 (L) 06/01/2022    AGRATIO 1.0 06/01/2022    GLOB 3.7 02/08/2024

## 2024-05-22 RX ORDER — MIRABEGRON 25 MG/1
25 TABLET, FILM COATED, EXTENDED RELEASE ORAL DAILY
Qty: 90 TABLET | Refills: 1 | Status: SHIPPED | OUTPATIENT
Start: 2024-05-22

## 2024-06-20 ENCOUNTER — OFFICE VISIT (OUTPATIENT)
Age: 69
End: 2024-06-20

## 2024-06-20 VITALS
OXYGEN SATURATION: 94 % | DIASTOLIC BLOOD PRESSURE: 73 MMHG | HEIGHT: 64 IN | WEIGHT: 174.2 LBS | SYSTOLIC BLOOD PRESSURE: 114 MMHG | BODY MASS INDEX: 29.74 KG/M2 | HEART RATE: 79 BPM

## 2024-06-20 DIAGNOSIS — N18.31 STAGE 3A CHRONIC KIDNEY DISEASE (HCC): Primary | ICD-10-CM

## 2024-06-20 RX ORDER — CHLORAL HYDRATE 500 MG
2 CAPSULE ORAL DAILY
COMMUNITY

## 2024-06-20 RX ORDER — LANOLIN ALCOHOL/MO/W.PET/CERES
500 CREAM (GRAM) TOPICAL
COMMUNITY
Start: 2023-06-17

## 2024-06-20 RX ORDER — MIRABEGRON 25 MG/1
25 TABLET, FILM COATED, EXTENDED RELEASE ORAL DAILY
COMMUNITY
Start: 2023-02-08

## 2024-06-20 NOTE — PROGRESS NOTES
1. Have you been to the ER, urgent care clinic since your last visit?  Hospitalized since your last visit?No    2. Have you seen or consulted any other health care providers outside of the [facility] since your last visit?  Include any pap smears or colon screening. No

## 2024-06-26 ENCOUNTER — HOSPITAL ENCOUNTER (OUTPATIENT)
Age: 69
Discharge: HOME | End: 2024-06-29
Payer: MEDICARE

## 2024-06-26 DIAGNOSIS — N18.31 STAGE 3A CHRONIC KIDNEY DISEASE (HCC): ICD-10-CM

## 2024-06-26 PROCEDURE — 76770 US EXAM ABDO BACK WALL COMP: CPT

## 2024-07-04 DIAGNOSIS — B00.9 HERPESVIRAL INFECTION, UNSPECIFIED: ICD-10-CM

## 2024-07-05 RX ORDER — VALACYCLOVIR HYDROCHLORIDE 500 MG/1
TABLET, FILM COATED ORAL
Qty: 90 TABLET | Refills: 1 | OUTPATIENT
Start: 2024-07-05

## 2024-08-17 DIAGNOSIS — M85.80 OTHER SPECIFIED DISORDERS OF BONE DENSITY AND STRUCTURE, UNSPECIFIED SITE: ICD-10-CM

## 2024-08-19 RX ORDER — RALOXIFENE HYDROCHLORIDE 60 MG/1
TABLET, FILM COATED ORAL
Qty: 90 TABLET | Refills: 1 | Status: SHIPPED | OUTPATIENT
Start: 2024-08-19

## 2024-08-26 SDOH — ECONOMIC STABILITY: FOOD INSECURITY: WITHIN THE PAST 12 MONTHS, THE FOOD YOU BOUGHT JUST DIDN'T LAST AND YOU DIDN'T HAVE MONEY TO GET MORE.: NEVER TRUE

## 2024-08-26 SDOH — ECONOMIC STABILITY: FOOD INSECURITY: WITHIN THE PAST 12 MONTHS, YOU WORRIED THAT YOUR FOOD WOULD RUN OUT BEFORE YOU GOT MONEY TO BUY MORE.: NEVER TRUE

## 2024-08-26 SDOH — ECONOMIC STABILITY: INCOME INSECURITY: HOW HARD IS IT FOR YOU TO PAY FOR THE VERY BASICS LIKE FOOD, HOUSING, MEDICAL CARE, AND HEATING?: NOT HARD AT ALL

## 2024-08-29 ENCOUNTER — OFFICE VISIT (OUTPATIENT)
Facility: CLINIC | Age: 69
End: 2024-08-29

## 2024-08-29 VITALS
BODY MASS INDEX: 29.37 KG/M2 | OXYGEN SATURATION: 97 % | DIASTOLIC BLOOD PRESSURE: 68 MMHG | HEIGHT: 64 IN | WEIGHT: 172 LBS | HEART RATE: 83 BPM | TEMPERATURE: 98.1 F | SYSTOLIC BLOOD PRESSURE: 102 MMHG | RESPIRATION RATE: 20 BRPM

## 2024-08-29 DIAGNOSIS — E78.5 HYPERLIPIDEMIA, UNSPECIFIED HYPERLIPIDEMIA TYPE: ICD-10-CM

## 2024-08-29 DIAGNOSIS — N39.3 STRESS INCONTINENCE (FEMALE) (MALE): ICD-10-CM

## 2024-08-29 DIAGNOSIS — F39 UNSPECIFIED MOOD (AFFECTIVE) DISORDER (HCC): Primary | ICD-10-CM

## 2024-08-29 DIAGNOSIS — N18.31 CHRONIC KIDNEY DISEASE, STAGE 3A (HCC): ICD-10-CM

## 2024-08-29 DIAGNOSIS — M67.441 GANGLION CYST OF FINGER OF RIGHT HAND: ICD-10-CM

## 2024-08-29 SDOH — ECONOMIC STABILITY: FOOD INSECURITY: WITHIN THE PAST 12 MONTHS, THE FOOD YOU BOUGHT JUST DIDN'T LAST AND YOU DIDN'T HAVE MONEY TO GET MORE.: NEVER TRUE

## 2024-08-29 SDOH — ECONOMIC STABILITY: FOOD INSECURITY: WITHIN THE PAST 12 MONTHS, YOU WORRIED THAT YOUR FOOD WOULD RUN OUT BEFORE YOU GOT MONEY TO BUY MORE.: NEVER TRUE

## 2024-08-29 SDOH — ECONOMIC STABILITY: INCOME INSECURITY: HOW HARD IS IT FOR YOU TO PAY FOR THE VERY BASICS LIKE FOOD, HOUSING, MEDICAL CARE, AND HEATING?: NOT HARD AT ALL

## 2024-08-29 NOTE — PROGRESS NOTES
1. \"Have you been to the ER, urgent care clinic since your last visit?  Hospitalized since your last visit?\"No    2. \"Have you seen or consulted any other health care providers outside of the [facility] since your last visit?\" No    3. For patients aged 45-75: Has the patient had a colonoscopy / FIT/ Cologuard? Yes      If the patient is female:    4. For patients aged 40-74: Has the patient had a mammogram within the past 2 years? Yes      5. For patients aged 21-65: Has the patient had a pap smear? Not applicable

## 2024-08-29 NOTE — PROGRESS NOTES
Times Moved in the Last Year: Not on file     Homeless in the Last Year: No       Review of Systems  Review of Systems - Review of all systems is negative except as noted above in the HPI.    Vital Signs  /68   Pulse 83   Temp 98.1 °F (36.7 °C)   Resp 20   Ht 1.626 m (5' 4\")   Wt 78 kg (172 lb)   SpO2 97%   BMI 29.52 kg/m²       Physical Exam  Physical Examination: General appearance - alert, well appearing, and in no distress, oriented to person, place, and time, and acyanotic, in no respiratory distress  Mental status - alert, oriented to person, place, and time  Neck - supple, no significant adenopathy  Lymphatics - no palpable lymphadenopathy, no hepatosplenomegaly  Chest - clear to auscultation, no wheezes, rales or rhonchi, symmetric air entry  Heart - S1 and S2 normal  Abdomen - no rebound tenderness noted  Neurological - motor and sensory grossly normal bilaterally  Musculoskeletal - osteoarthritic changes noted in both hands, cystic swelling proximal phalanx palmar aspect little finger right hand  Extremities - no pedal edema noted, intact peripheral pulses      Results  No results found for this visit on 08/29/24.    ASSESSMENT and PLAN    ICD-10-CM    1. Unspecified mood (affective) disorder (HCC)  F39       2. Ganglion cyst of finger of right hand  M67.441 [facility] - Ernesto Kate DO, Hand Surgery, [place] ([facility])      3. Stress incontinence (female) (male)  N39.3       4. Hyperlipidemia, unspecified hyperlipidemia type  E78.5       5. Chronic kidney disease, stage 3a (HCC)  N18.31       lab results and schedule of future lab studies reviewed with patient  reviewed diet, exercise and weight control  cardiovascular risk and specific lipid/LDL goals reviewed  reviewed medications and side effects in detail      I have discussed the diagnosis with the patient and the intended plan of care as seen in the above orders. The patient has received an after-visit summary and questions were  answered concerning future plans. I have discussed medication, side effects, and warnings with the patient in detail. The patient verbalized understanding and is in agreement with the plan of care. The patient will contact the office with any additional concerns.    Geoffrey Waldron MD    PLEASE NOTE:   This document has been produced using voice recognition software. Unrecognized errors in transcription may be present

## 2024-09-09 ENCOUNTER — OFFICE VISIT (OUTPATIENT)
Age: 69
End: 2024-09-09
Payer: MEDICARE

## 2024-09-09 VITALS — WEIGHT: 170 LBS | BODY MASS INDEX: 29.02 KG/M2 | HEIGHT: 64 IN

## 2024-09-09 DIAGNOSIS — R22.31 MASS OF RIGHT FINGER: Primary | ICD-10-CM

## 2024-09-09 DIAGNOSIS — M79.89 SWELLING OF RIGHT LITTLE FINGER: ICD-10-CM

## 2024-09-09 PROCEDURE — 1090F PRES/ABSN URINE INCON ASSESS: CPT | Performed by: ORTHOPAEDIC SURGERY

## 2024-09-09 PROCEDURE — G8399 PT W/DXA RESULTS DOCUMENT: HCPCS | Performed by: ORTHOPAEDIC SURGERY

## 2024-09-09 PROCEDURE — 1123F ACP DISCUSS/DSCN MKR DOCD: CPT | Performed by: ORTHOPAEDIC SURGERY

## 2024-09-09 PROCEDURE — 4004F PT TOBACCO SCREEN RCVD TLK: CPT | Performed by: ORTHOPAEDIC SURGERY

## 2024-09-09 PROCEDURE — G8419 CALC BMI OUT NRM PARAM NOF/U: HCPCS | Performed by: ORTHOPAEDIC SURGERY

## 2024-09-09 PROCEDURE — 73140 X-RAY EXAM OF FINGER(S): CPT | Performed by: ORTHOPAEDIC SURGERY

## 2024-09-09 PROCEDURE — 99214 OFFICE O/P EST MOD 30 MIN: CPT | Performed by: ORTHOPAEDIC SURGERY

## 2024-09-09 PROCEDURE — G8427 DOCREV CUR MEDS BY ELIG CLIN: HCPCS | Performed by: ORTHOPAEDIC SURGERY

## 2024-09-09 PROCEDURE — 3017F COLORECTAL CA SCREEN DOC REV: CPT | Performed by: ORTHOPAEDIC SURGERY

## 2024-09-16 ENCOUNTER — HOSPITAL ENCOUNTER (OUTPATIENT)
Age: 69
Discharge: HOME | End: 2024-09-19
Payer: MEDICARE

## 2024-09-16 DIAGNOSIS — N18.31 STAGE 3A CHRONIC KIDNEY DISEASE (HCC): ICD-10-CM

## 2024-09-16 LAB
ALBUMIN SERPL-MCNC: 3.9 G/DL (ref 3.4–5)
ANION GAP SERPL CALC-SCNC: 8 MMOL/L (ref 3–18)
BUN SERPL-MCNC: 15 MG/DL (ref 7–18)
BUN/CREAT SERPL: 13 (ref 12–20)
CA-I BLD-MCNC: 9.4 MG/DL (ref 8.5–10.1)
CHLORIDE SERPL-SCNC: 106 MMOL/L (ref 100–111)
CO2 SERPL-SCNC: 25 MMOL/L (ref 21–32)
CREAT SERPL-MCNC: 1.12 MG/DL (ref 0.6–1.3)
ERYTHROCYTE [DISTWIDTH] IN BLOOD BY AUTOMATED COUNT: 12.3 % (ref 11.6–14.5)
GLUCOSE SERPL-MCNC: 76 MG/DL (ref 74–99)
HCT VFR BLD AUTO: 38.9 % (ref 35–45)
HGB BLD-MCNC: 13.1 G/DL (ref 12–16)
MCH RBC QN AUTO: 31.7 PG (ref 24–34)
MCHC RBC AUTO-ENTMCNC: 33.7 G/DL (ref 31–37)
MCV RBC AUTO: 94.2 FL (ref 78–100)
NRBC # BLD: 0 K/UL (ref 0–0.01)
NRBC BLD-RTO: 0 PER 100 WBC
PHOSPHATE SERPL-MCNC: 2.4 MG/DL (ref 2.5–4.9)
PLATELET # BLD AUTO: 333 K/UL (ref 135–420)
PMV BLD AUTO: 11 FL (ref 9.2–11.8)
POTASSIUM SERPL-SCNC: 3.7 MMOL/L (ref 3.5–5.5)
RBC # BLD AUTO: 4.13 M/UL (ref 4.2–5.3)
SODIUM SERPL-SCNC: 139 MMOL/L (ref 136–145)
WBC # BLD AUTO: 9 K/UL (ref 4.6–13.2)

## 2024-09-16 PROCEDURE — 80069 RENAL FUNCTION PANEL: CPT

## 2024-09-16 PROCEDURE — 85027 COMPLETE CBC AUTOMATED: CPT

## 2024-09-16 PROCEDURE — 36415 COLL VENOUS BLD VENIPUNCTURE: CPT

## 2024-09-16 PROCEDURE — 82570 ASSAY OF URINE CREATININE: CPT

## 2024-09-16 PROCEDURE — 84156 ASSAY OF PROTEIN URINE: CPT

## 2024-09-17 LAB
CREAT UR-MCNC: 68 MG/DL (ref 30–125)
PROT UR-MCNC: <5 MG/DL
PROT/CREAT UR-RTO: NORMAL

## 2024-09-25 ENCOUNTER — HOSPITAL ENCOUNTER (OUTPATIENT)
Age: 69
Discharge: HOME | End: 2024-09-28
Payer: MEDICARE

## 2024-09-25 DIAGNOSIS — R22.31 MASS OF RIGHT FINGER: ICD-10-CM

## 2024-09-25 DIAGNOSIS — M79.89 SWELLING OF RIGHT LITTLE FINGER: ICD-10-CM

## 2024-09-25 PROCEDURE — 73220 MRI UPPR EXTREMITY W/O&W/DYE: CPT

## 2024-09-25 PROCEDURE — A9579 GAD-BASE MR CONTRAST NOS,1ML: HCPCS | Performed by: ORTHOPAEDIC SURGERY

## 2024-09-25 PROCEDURE — 6360000004 HC RX CONTRAST MEDICATION: Performed by: ORTHOPAEDIC SURGERY

## 2024-09-25 RX ADMIN — GADOTERIDOL 8 ML: 279.3 INJECTION, SOLUTION INTRAVENOUS at 11:24

## 2024-09-26 ENCOUNTER — OFFICE VISIT (OUTPATIENT)
Age: 69
End: 2024-09-26
Payer: MEDICARE

## 2024-09-26 VITALS
HEIGHT: 64 IN | OXYGEN SATURATION: 96 % | SYSTOLIC BLOOD PRESSURE: 112 MMHG | HEART RATE: 84 BPM | DIASTOLIC BLOOD PRESSURE: 71 MMHG | BODY MASS INDEX: 29.6 KG/M2 | WEIGHT: 173.4 LBS

## 2024-09-26 DIAGNOSIS — N18.31 STAGE 3A CHRONIC KIDNEY DISEASE (HCC): Primary | ICD-10-CM

## 2024-09-26 PROCEDURE — 1123F ACP DISCUSS/DSCN MKR DOCD: CPT | Performed by: INTERNAL MEDICINE

## 2024-09-26 PROCEDURE — 99213 OFFICE O/P EST LOW 20 MIN: CPT | Performed by: INTERNAL MEDICINE

## 2024-10-06 DIAGNOSIS — B00.9 HERPESVIRAL INFECTION, UNSPECIFIED: ICD-10-CM

## 2024-10-06 DIAGNOSIS — N39.3 STRESS INCONTINENCE (FEMALE) (MALE): ICD-10-CM

## 2024-10-07 RX ORDER — VALACYCLOVIR HYDROCHLORIDE 500 MG/1
TABLET, FILM COATED ORAL
Qty: 90 TABLET | Refills: 1 | Status: SHIPPED | OUTPATIENT
Start: 2024-10-07

## 2024-10-07 RX ORDER — MIRABEGRON 25 MG/1
25 TABLET, FILM COATED, EXTENDED RELEASE ORAL DAILY
Qty: 90 TABLET | Refills: 1 | OUTPATIENT
Start: 2024-10-07

## 2024-10-07 NOTE — TELEPHONE ENCOUNTER
Pt called in to have the following medication refilled   mirabegron (MYRBETRIQ) 25 MG TB24 . Please advise pt

## 2024-10-07 NOTE — TELEPHONE ENCOUNTER
Last seen 8/29/2024   Last labs 09/16/2024  Last filled  02/22/2024  Next appointment 2/13/2025     Lab Results   Component Value Date     09/16/2024    K 3.7 09/16/2024     09/16/2024    CO2 25 09/16/2024    BUN 15 09/16/2024    CREATININE 1.12 09/16/2024    GLUCOSE 76 09/16/2024    CALCIUM 9.4 09/16/2024    BILITOT 0.7 02/08/2024    ALKPHOS 64 02/08/2024    AST 22 02/08/2024    ALT 46 02/08/2024    LABGLOM 54 (L) 09/16/2024    GFRAA 54 (L) 06/01/2022    AGRATIO 1.0 06/01/2022    GLOB 3.7 02/08/2024

## 2024-10-08 RX ORDER — MIRABEGRON 25 MG/1
25 TABLET, FILM COATED, EXTENDED RELEASE ORAL DAILY
Qty: 90 TABLET | Refills: 0 | Status: SHIPPED | OUTPATIENT
Start: 2024-10-08

## 2024-10-31 DIAGNOSIS — E78.5 HYPERLIPIDEMIA, UNSPECIFIED: ICD-10-CM

## 2024-10-31 RX ORDER — GEMFIBROZIL 600 MG/1
TABLET, FILM COATED ORAL
Qty: 180 TABLET | Refills: 1 | Status: SHIPPED | OUTPATIENT
Start: 2024-10-31

## 2024-10-31 NOTE — TELEPHONE ENCOUNTER
Last seen 8/29/2024   Last labs 9/16/2024  Last filled  5/9/2024  Next appointment 2/13/2025     Lab Results   Component Value Date     09/16/2024    K 3.7 09/16/2024     09/16/2024    CO2 25 09/16/2024    BUN 15 09/16/2024    CREATININE 1.12 09/16/2024    GLUCOSE 76 09/16/2024    CALCIUM 9.4 09/16/2024    BILITOT 0.7 02/08/2024    ALKPHOS 64 02/08/2024    AST 22 02/08/2024    ALT 46 02/08/2024    LABGLOM 54 (L) 09/16/2024    GFRAA 54 (L) 06/01/2022    AGRATIO 1.0 06/01/2022    GLOB 3.7 02/08/2024

## 2024-12-10 ENCOUNTER — OFFICE VISIT (OUTPATIENT)
Facility: CLINIC | Age: 69
End: 2024-12-10
Payer: MEDICARE

## 2024-12-10 VITALS
RESPIRATION RATE: 20 BRPM | HEIGHT: 64 IN | BODY MASS INDEX: 29.37 KG/M2 | DIASTOLIC BLOOD PRESSURE: 77 MMHG | OXYGEN SATURATION: 96 % | WEIGHT: 172 LBS | SYSTOLIC BLOOD PRESSURE: 115 MMHG | TEMPERATURE: 97.9 F | HEART RATE: 91 BPM

## 2024-12-10 DIAGNOSIS — J40 BRONCHITIS: Primary | ICD-10-CM

## 2024-12-10 DIAGNOSIS — N39.3 STRESS INCONTINENCE (FEMALE) (MALE): ICD-10-CM

## 2024-12-10 DIAGNOSIS — F39 UNSPECIFIED MOOD (AFFECTIVE) DISORDER (HCC): ICD-10-CM

## 2024-12-10 DIAGNOSIS — E78.5 HYPERLIPIDEMIA, UNSPECIFIED HYPERLIPIDEMIA TYPE: ICD-10-CM

## 2024-12-10 PROCEDURE — 1123F ACP DISCUSS/DSCN MKR DOCD: CPT | Performed by: FAMILY MEDICINE

## 2024-12-10 PROCEDURE — 1160F RVW MEDS BY RX/DR IN RCRD: CPT | Performed by: FAMILY MEDICINE

## 2024-12-10 PROCEDURE — 1159F MED LIST DOCD IN RCRD: CPT | Performed by: FAMILY MEDICINE

## 2024-12-10 PROCEDURE — 99214 OFFICE O/P EST MOD 30 MIN: CPT | Performed by: FAMILY MEDICINE

## 2024-12-10 RX ORDER — DOXYCYCLINE HYCLATE 100 MG
100 TABLET ORAL 2 TIMES DAILY
Qty: 20 TABLET | Refills: 0 | Status: SHIPPED | OUTPATIENT
Start: 2024-12-10 | End: 2024-12-20

## 2024-12-10 RX ORDER — DEXTROMETHORPHAN HYDROBROMIDE AND PROMETHAZINE HYDROCHLORIDE 15; 6.25 MG/5ML; MG/5ML
5 SYRUP ORAL 4 TIMES DAILY PRN
Qty: 240 ML | Refills: 0 | Status: SHIPPED | OUTPATIENT
Start: 2024-12-10

## 2024-12-10 NOTE — TELEPHONE ENCOUNTER
Last seen 12/10/2024   Last labs 9/16/2024  Last filled  5/9/2024 sertraline                   10/08/2024 mirabegron                     5/9/2024 ezetimibe   Next appointment 2/13/2025     Lab Results   Component Value Date     09/16/2024    K 3.7 09/16/2024     09/16/2024    CO2 25 09/16/2024    BUN 15 09/16/2024    CREATININE 1.12 09/16/2024    GLUCOSE 76 09/16/2024    CALCIUM 9.4 09/16/2024    BILITOT 0.7 02/08/2024    ALKPHOS 64 02/08/2024    AST 22 02/08/2024    ALT 46 02/08/2024    LABGLOM 54 (L) 09/16/2024    GFRAA 54 (L) 06/01/2022    AGRATIO 1.0 06/01/2022    GLOB 3.7 02/08/2024

## 2024-12-10 NOTE — PROGRESS NOTES
HPI  Jennifer Merida comes in for follow up care.  Bronchitis: Patient has a cough that is productive of mucopurulent phlegm..  Associated generalized malaise, fatigue and joint aches.  She denies fever or chills.  She has taken OTC medication with much improvement.  She continues to do supportive care.  Comes in for evaluation.  This is mucopurulent bronchitis.  I will order chest x-ray.  Will give doxycycline and Promethazine DM.  I will follow-up at next visit or sooner as needed..  Dyslipidemia: Patient has dyslipidemia.  She is on Zetia and gemfibrozil.  Continue current treatment plan.  She will exercise and take a diet low in polysaturated fats.  Mood disorder: Patient has mood disorder with anxiety and depression.  She takes sertraline.  She is doing supportive care.  Continue current treatment plan.  OAB: Patient has overactive bladder.  She is on Myrbetriq.  She is followed up with a urologist.  Will continue current treatment plan.      Past Medical History  History reviewed. No pertinent past medical history.    Surgical History  Past Surgical History:   Procedure Laterality Date    HYSTERECTOMY (CERVIX STATUS UNKNOWN)  2009        Medications  Current Outpatient Medications   Medication Sig Dispense Refill    gemfibrozil (LOPID) 600 MG tablet TAKE 1 TABLET BY MOUTH TWICE A  tablet 1    mirabegron (MYRBETRIQ) 25 MG TB24 Take 1 tablet by mouth daily 90 tablet 0    valACYclovir (VALTREX) 500 MG tablet TAKE 1 TABLET BY MOUTH EVERY DAY 90 tablet 1    raloxifene (EVISTA) 60 MG tablet TAKE 1 TABLET BY MOUTH EVERY DAY 90 tablet 1    Omega-3 Fatty Acids (FISH OIL) 1000 MG capsule Take 2 capsules by mouth daily      Calcium 500-2.5 MG-MCG CHEW 500 mg      sertraline (ZOLOFT) 25 MG tablet TAKE 1 TABLET BY MOUTH EVERY DAY 90 tablet 2    ezetimibe (ZETIA) 10 MG tablet TAKE 1 TABLET BY MOUTH EVERY DAY 90 tablet 2    glucosamine-chondroitin 750-600 MG TABS tablet Take 2 tablets by mouth daily (Patient not

## 2024-12-11 ENCOUNTER — TELEPHONE (OUTPATIENT)
Facility: CLINIC | Age: 69
End: 2024-12-11

## 2024-12-11 RX ORDER — AZITHROMYCIN 250 MG/1
TABLET, FILM COATED ORAL
Qty: 6 TABLET | Refills: 0 | Status: SHIPPED | OUTPATIENT
Start: 2024-12-11 | End: 2024-12-21

## 2024-12-11 RX ORDER — EZETIMIBE 10 MG/1
TABLET ORAL
Qty: 90 TABLET | Refills: 2 | Status: SHIPPED | OUTPATIENT
Start: 2024-12-11

## 2024-12-11 RX ORDER — SERTRALINE HYDROCHLORIDE 25 MG/1
TABLET, FILM COATED ORAL
Qty: 90 TABLET | Refills: 2 | Status: SHIPPED | OUTPATIENT
Start: 2024-12-11

## 2024-12-11 RX ORDER — MIRABEGRON 25 MG/1
25 TABLET, FILM COATED, EXTENDED RELEASE ORAL DAILY
Qty: 90 TABLET | Refills: 0 | Status: SHIPPED | OUTPATIENT
Start: 2024-12-11

## 2024-12-11 NOTE — TELEPHONE ENCOUNTER
Pt called in today stating that she is had the following reaction to the following medicine:    doxycycline hyclate (VIBRA-TABS) 100 MG tablet       Symptom:  Nausea  Sweating  Stomach cramps    Pt stated she has stop taking the medication and wants to know can she get something else to take in place of that medication. Please advise pt.

## 2025-01-08 ENCOUNTER — OFFICE VISIT (OUTPATIENT)
Age: 70
End: 2025-01-08
Payer: MEDICARE

## 2025-01-08 VITALS — HEIGHT: 64 IN | BODY MASS INDEX: 29.37 KG/M2 | WEIGHT: 172 LBS

## 2025-01-08 DIAGNOSIS — M67.441 GANGLION OF FLEXOR TENDON SHEATH OF RIGHT LITTLE FINGER: Primary | ICD-10-CM

## 2025-01-08 PROCEDURE — 99214 OFFICE O/P EST MOD 30 MIN: CPT | Performed by: ORTHOPAEDIC SURGERY

## 2025-01-08 PROCEDURE — 1159F MED LIST DOCD IN RCRD: CPT | Performed by: ORTHOPAEDIC SURGERY

## 2025-01-08 PROCEDURE — 1126F AMNT PAIN NOTED NONE PRSNT: CPT | Performed by: ORTHOPAEDIC SURGERY

## 2025-01-08 PROCEDURE — 1123F ACP DISCUSS/DSCN MKR DOCD: CPT | Performed by: ORTHOPAEDIC SURGERY

## 2025-01-08 PROCEDURE — 1160F RVW MEDS BY RX/DR IN RCRD: CPT | Performed by: ORTHOPAEDIC SURGERY

## 2025-01-08 NOTE — PROGRESS NOTES
Jennifer Merida is a 69 y.o. female right handed retiree.  Worker's Compensation and legal considerations: none    Chief Complaint   Patient presents with    Follow-up     Right little finger      Pain Score:   0 - No pain    Subjective:     1/8/2025 HPI: Patient presents today for right little finger MRI review.  She reports no pain today and reports that the size of the mass has not increased since her last visit.    Initial HPI: Patient presents today with complaints of a mass at the base of her right little finger.  She reports it to have been there for a few months and getting bigger as well as painful.    Date of onset: Early summer 2020  Injury: No  Prior Treatment:  No  Contributory history: None    ROS: Review of Systems - General ROS: negative except HPI    History reviewed. No pertinent past medical history.    Past Surgical History:   Procedure Laterality Date    HYSTERECTOMY (CERVIX STATUS UNKNOWN)  2009        Current Outpatient Medications   Medication Sig Dispense Refill    sertraline (ZOLOFT) 25 MG tablet TAKE 1 TABLET BY MOUTH EVERY DAY 90 tablet 2    mirabegron (MYRBETRIQ) 25 MG TB24 TAKE 1 TABLET BY MOUTH EVERY DAY 90 tablet 0    ezetimibe (ZETIA) 10 MG tablet TAKE 1 TABLET BY MOUTH EVERY DAY 90 tablet 2    gemfibrozil (LOPID) 600 MG tablet TAKE 1 TABLET BY MOUTH TWICE A  tablet 1    valACYclovir (VALTREX) 500 MG tablet TAKE 1 TABLET BY MOUTH EVERY DAY 90 tablet 1    raloxifene (EVISTA) 60 MG tablet TAKE 1 TABLET BY MOUTH EVERY DAY 90 tablet 1    Omega-3 Fatty Acids (FISH OIL) 1000 MG capsule Take 2 capsules by mouth daily      Calcium 500-2.5 MG-MCG CHEW 500 mg       No current facility-administered medications for this visit.       Allergies   Allergen Reactions    Penicillins      Other reaction(s): Unknown (comments)         Ht 1.626 m (5' 4\")   Wt 78 kg (172 lb)   BMI 29.52 kg/m²   Physical Exam  Vitals and nursing note reviewed.   Constitutional:       General: She is not in

## 2025-02-12 SDOH — ECONOMIC STABILITY: FOOD INSECURITY: WITHIN THE PAST 12 MONTHS, YOU WORRIED THAT YOUR FOOD WOULD RUN OUT BEFORE YOU GOT MONEY TO BUY MORE.: NEVER TRUE

## 2025-02-12 SDOH — ECONOMIC STABILITY: INCOME INSECURITY: IN THE LAST 12 MONTHS, WAS THERE A TIME WHEN YOU WERE NOT ABLE TO PAY THE MORTGAGE OR RENT ON TIME?: NO

## 2025-02-12 SDOH — HEALTH STABILITY: PHYSICAL HEALTH: ON AVERAGE, HOW MANY DAYS PER WEEK DO YOU ENGAGE IN MODERATE TO STRENUOUS EXERCISE (LIKE A BRISK WALK)?: 3 DAYS

## 2025-02-12 SDOH — ECONOMIC STABILITY: FOOD INSECURITY: WITHIN THE PAST 12 MONTHS, THE FOOD YOU BOUGHT JUST DIDN'T LAST AND YOU DIDN'T HAVE MONEY TO GET MORE.: NEVER TRUE

## 2025-02-12 SDOH — HEALTH STABILITY: PHYSICAL HEALTH: ON AVERAGE, HOW MANY MINUTES DO YOU ENGAGE IN EXERCISE AT THIS LEVEL?: 60 MIN

## 2025-02-12 SDOH — ECONOMIC STABILITY: TRANSPORTATION INSECURITY
IN THE PAST 12 MONTHS, HAS THE LACK OF TRANSPORTATION KEPT YOU FROM MEDICAL APPOINTMENTS OR FROM GETTING MEDICATIONS?: NO

## 2025-02-12 ASSESSMENT — LIFESTYLE VARIABLES
HOW MANY STANDARD DRINKS CONTAINING ALCOHOL DO YOU HAVE ON A TYPICAL DAY: 1 OR 2
HOW MANY STANDARD DRINKS CONTAINING ALCOHOL DO YOU HAVE ON A TYPICAL DAY: 1
HOW OFTEN DO YOU HAVE SIX OR MORE DRINKS ON ONE OCCASION: 1
HOW OFTEN DO YOU HAVE A DRINK CONTAINING ALCOHOL: 3
HOW OFTEN DO YOU HAVE A DRINK CONTAINING ALCOHOL: 2-4 TIMES A MONTH

## 2025-02-12 ASSESSMENT — PATIENT HEALTH QUESTIONNAIRE - PHQ9
1. LITTLE INTEREST OR PLEASURE IN DOING THINGS: NOT AT ALL
SUM OF ALL RESPONSES TO PHQ9 QUESTIONS 1 & 2: 0
SUM OF ALL RESPONSES TO PHQ QUESTIONS 1-9: 0
SUM OF ALL RESPONSES TO PHQ QUESTIONS 1-9: 0
2. FEELING DOWN, DEPRESSED OR HOPELESS: NOT AT ALL
SUM OF ALL RESPONSES TO PHQ QUESTIONS 1-9: 0
SUM OF ALL RESPONSES TO PHQ QUESTIONS 1-9: 0

## 2025-02-13 ENCOUNTER — OFFICE VISIT (OUTPATIENT)
Facility: CLINIC | Age: 70
End: 2025-02-13
Payer: MEDICARE

## 2025-02-13 VITALS
HEIGHT: 64 IN | HEART RATE: 83 BPM | DIASTOLIC BLOOD PRESSURE: 73 MMHG | OXYGEN SATURATION: 98 % | WEIGHT: 174 LBS | BODY MASS INDEX: 29.71 KG/M2 | RESPIRATION RATE: 20 BRPM | TEMPERATURE: 98 F | SYSTOLIC BLOOD PRESSURE: 122 MMHG

## 2025-02-13 DIAGNOSIS — R73.9 HYPERGLYCEMIA: ICD-10-CM

## 2025-02-13 DIAGNOSIS — Z00.00 MEDICARE ANNUAL WELLNESS VISIT, SUBSEQUENT: Primary | ICD-10-CM

## 2025-02-13 DIAGNOSIS — N18.31 STAGE 3A CHRONIC KIDNEY DISEASE (HCC): ICD-10-CM

## 2025-02-13 DIAGNOSIS — N39.3 STRESS INCONTINENCE (FEMALE) (MALE): ICD-10-CM

## 2025-02-13 DIAGNOSIS — Z12.31 SCREENING MAMMOGRAM FOR BREAST CANCER: ICD-10-CM

## 2025-02-13 DIAGNOSIS — D47.3 ESSENTIAL (HEMORRHAGIC) THROMBOCYTHEMIA (HCC): ICD-10-CM

## 2025-02-13 DIAGNOSIS — F33.1 MAJOR DEPRESSIVE DISORDER, RECURRENT, MODERATE (HCC): ICD-10-CM

## 2025-02-13 DIAGNOSIS — E78.5 HYPERLIPIDEMIA, UNSPECIFIED HYPERLIPIDEMIA TYPE: ICD-10-CM

## 2025-02-13 PROBLEM — R09.89 CHEST CONGESTION: Status: ACTIVE | Noted: 2024-12-09

## 2025-02-13 PROCEDURE — 1160F RVW MEDS BY RX/DR IN RCRD: CPT | Performed by: FAMILY MEDICINE

## 2025-02-13 PROCEDURE — 1123F ACP DISCUSS/DSCN MKR DOCD: CPT | Performed by: FAMILY MEDICINE

## 2025-02-13 PROCEDURE — G0439 PPPS, SUBSEQ VISIT: HCPCS | Performed by: FAMILY MEDICINE

## 2025-02-13 PROCEDURE — 1159F MED LIST DOCD IN RCRD: CPT | Performed by: FAMILY MEDICINE

## 2025-02-13 SDOH — ECONOMIC STABILITY: FOOD INSECURITY: WITHIN THE PAST 12 MONTHS, THE FOOD YOU BOUGHT JUST DIDN'T LAST AND YOU DIDN'T HAVE MONEY TO GET MORE.: NEVER TRUE

## 2025-02-13 SDOH — ECONOMIC STABILITY: FOOD INSECURITY: WITHIN THE PAST 12 MONTHS, YOU WORRIED THAT YOUR FOOD WOULD RUN OUT BEFORE YOU GOT MONEY TO BUY MORE.: NEVER TRUE

## 2025-02-13 ASSESSMENT — PATIENT HEALTH QUESTIONNAIRE - PHQ9
SUM OF ALL RESPONSES TO PHQ QUESTIONS 1-9: 0
SUM OF ALL RESPONSES TO PHQ QUESTIONS 1-9: 0
7. TROUBLE CONCENTRATING ON THINGS, SUCH AS READING THE NEWSPAPER OR WATCHING TELEVISION: NOT AT ALL
6. FEELING BAD ABOUT YOURSELF - OR THAT YOU ARE A FAILURE OR HAVE LET YOURSELF OR YOUR FAMILY DOWN: NOT AT ALL
5. POOR APPETITE OR OVEREATING: NOT AT ALL
SUM OF ALL RESPONSES TO PHQ QUESTIONS 1-9: 0
8. MOVING OR SPEAKING SO SLOWLY THAT OTHER PEOPLE COULD HAVE NOTICED. OR THE OPPOSITE, BEING SO FIGETY OR RESTLESS THAT YOU HAVE BEEN MOVING AROUND A LOT MORE THAN USUAL: NOT AT ALL
2. FEELING DOWN, DEPRESSED OR HOPELESS: NOT AT ALL
SUM OF ALL RESPONSES TO PHQ QUESTIONS 1-9: 0
4. FEELING TIRED OR HAVING LITTLE ENERGY: NOT AT ALL
SUM OF ALL RESPONSES TO PHQ9 QUESTIONS 1 & 2: 0
10. IF YOU CHECKED OFF ANY PROBLEMS, HOW DIFFICULT HAVE THESE PROBLEMS MADE IT FOR YOU TO DO YOUR WORK, TAKE CARE OF THINGS AT HOME, OR GET ALONG WITH OTHER PEOPLE: NOT DIFFICULT AT ALL
9. THOUGHTS THAT YOU WOULD BE BETTER OFF DEAD, OR OF HURTING YOURSELF: NOT AT ALL
1. LITTLE INTEREST OR PLEASURE IN DOING THINGS: NOT AT ALL
3. TROUBLE FALLING OR STAYING ASLEEP: NOT AT ALL

## 2025-02-13 NOTE — PROGRESS NOTES
Medicare Annual Wellness Visit    Jennifer Merida is here for Medicare AWV    Assessment & Plan    Diagnosis Orders   1. Medicare annual wellness visit, subsequent        2. Major depressive disorder, recurrent, moderate (HCC)        3. Essential (hemorrhagic) thrombocythemia (HCC)  CBC with Auto Differential    Comprehensive Metabolic Panel      4. Stage 3a chronic kidney disease (HCC)        5. Hyperlipidemia, unspecified hyperlipidemia type  Lipid Panel      6. Stress incontinence (female) (male)  [facility]      7. Hyperglycemia  Hemoglobin A1C      8. Screening mammogram for breast cancer  [facility] DIGITAL SCREEN W OR WO CAD BILATERAL           Return in about 6 months (around 8/13/2025), or if symptoms worsen or fail to improve, for mood disorder, dyslipidemia.     Subjective   Jennifer Merida comes in for Medicare wellness exam.  Stress incontinence: Patient has stress incontinence.  She gets leakage of urine when she coughs or bears down.  She is on Myrbetriq and this has been helping.  However states that the symptoms have become more frequent lately and Myrbetriq does not help as much as it used to in the past.  She would like to get a referral to the urologist.  This is placed.  Mood disorder: Patient has mood disorder with anxiety and depression.  She is on Zoloft 25 mg daily.  Stable on medication.  Will continue current treatment plan.  Hypertension: Patient has hypertension for blood pressure is stable.  Denies headache, changes in vision or focal weakness.  Patient will take a low-sodium diet and the DASH diet.  Dyslipidemia: Patient has dyslipidemia.  He is on Zetia and Lopid.  Stable on medication.  Will recheck labs.  Continue current treatment plan.  She will exercise and take a diet low in polysaturated fats.  CKD: Patient has CKD stage IIIa.  She has been followed up by the nephrologist.  Plan is to avoid nephrotoxic medications.  Hyperglycemia: We will check HbA1c.  Health

## 2025-02-13 NOTE — PATIENT INSTRUCTIONS
of quitting for good. Quitting is one of the most important things you can do to protect your heart. It is never too late to quit. Try to avoid secondhand smoke too.     Stay at a weight that's healthy for you. Talk to your doctor if you need help losing weight.     Try to get 7 to 9 hours of sleep each night.     Limit alcohol to 2 drinks a day for men and 1 drink a day for women. Too much alcohol can cause health problems.     Manage other health problems such as diabetes, high blood pressure, and high cholesterol. If you think you may have a problem with alcohol or drug use, talk to your doctor.   Medicines    Take your medicines exactly as prescribed. Call your doctor if you think you are having a problem with your medicine.     If your doctor recommends aspirin, take the amount directed each day. Make sure you take aspirin and not another kind of pain reliever, such as acetaminophen (Tylenol).   When should you call for help?   Call 911 if you have symptoms of a heart attack. These may include:    Chest pain or pressure, or a strange feeling in the chest.     Sweating.     Shortness of breath.     Pain, pressure, or a strange feeling in the back, neck, jaw, or upper belly or in one or both shoulders or arms.     Lightheadedness or sudden weakness.     A fast or irregular heartbeat.   After you call 911, the  may tell you to chew 1 adult-strength or 2 to 4 low-dose aspirin. Wait for an ambulance. Do not try to drive yourself.  Watch closely for changes in your health, and be sure to contact your doctor if you have any problems.  Where can you learn more?  Go to https://www.[employer].net/patientEd and enter F075 to learn more about \"A Healthy Heart: Care Instructions.\"  Current as of: July 31, 2024  Content Version: 14.3  © 2024 [employer].   Care instructions adapted under license by [employer]. If you have questions about a medical condition or this instruction, always ask your healthcare

## 2025-03-12 ENCOUNTER — OFFICE VISIT (OUTPATIENT)
Age: 70
End: 2025-03-12
Payer: MEDICARE

## 2025-03-12 VITALS
WEIGHT: 174.4 LBS | SYSTOLIC BLOOD PRESSURE: 139 MMHG | HEIGHT: 64 IN | BODY MASS INDEX: 29.78 KG/M2 | DIASTOLIC BLOOD PRESSURE: 68 MMHG

## 2025-03-12 DIAGNOSIS — M79.89 SWELLING OF RIGHT LITTLE FINGER: ICD-10-CM

## 2025-03-12 DIAGNOSIS — M67.441 GANGLION OF FLEXOR TENDON SHEATH OF RIGHT LITTLE FINGER: ICD-10-CM

## 2025-03-12 DIAGNOSIS — Z01.818 PREOP EXAMINATION: Primary | ICD-10-CM

## 2025-03-12 DIAGNOSIS — R22.31 MASS OF RIGHT FINGER: Primary | ICD-10-CM

## 2025-03-12 PROCEDURE — 1160F RVW MEDS BY RX/DR IN RCRD: CPT | Performed by: ORTHOPAEDIC SURGERY

## 2025-03-12 PROCEDURE — 1125F AMNT PAIN NOTED PAIN PRSNT: CPT | Performed by: ORTHOPAEDIC SURGERY

## 2025-03-12 PROCEDURE — 1159F MED LIST DOCD IN RCRD: CPT | Performed by: ORTHOPAEDIC SURGERY

## 2025-03-12 PROCEDURE — 1123F ACP DISCUSS/DSCN MKR DOCD: CPT | Performed by: ORTHOPAEDIC SURGERY

## 2025-03-12 PROCEDURE — 99214 OFFICE O/P EST MOD 30 MIN: CPT | Performed by: ORTHOPAEDIC SURGERY

## 2025-03-12 NOTE — PROGRESS NOTES
Jennifer Merida is a 69 y.o. female right handed retiree.  Worker's Compensation and legal considerations: none    Chief Complaint   Patient presents with    Follow-up     Surgery on cyst on Right little finger       Pain Score:   2    Subjective:     3/12/2025 HPI: Patient presents today for follow-up wanting to set up surgery for her right little finger mass.  She reports no pain but discomfort with daily activities.    1/8/2025 HPI: Patient presents today for right little finger MRI review.  She reports no pain today and reports that the size of the mass has not increased since her last visit.    Initial HPI: Patient presents today with complaints of a mass at the base of her right little finger.  She reports it to have been there for a few months and getting bigger as well as painful.    Date of onset: Early summer 2020  Injury: No  Prior Treatment:  No  Contributory history: None    ROS: Review of Systems - General ROS: negative except HPI    History reviewed. No pertinent past medical history.    Past Surgical History:   Procedure Laterality Date    HYSTERECTOMY (CERVIX STATUS UNKNOWN)  2009        Current Outpatient Medications   Medication Sig Dispense Refill    sertraline (ZOLOFT) 25 MG tablet TAKE 1 TABLET BY MOUTH EVERY DAY 90 tablet 2    mirabegron (MYRBETRIQ) 25 MG TB24 TAKE 1 TABLET BY MOUTH EVERY DAY 90 tablet 0    ezetimibe (ZETIA) 10 MG tablet TAKE 1 TABLET BY MOUTH EVERY DAY 90 tablet 2    gemfibrozil (LOPID) 600 MG tablet TAKE 1 TABLET BY MOUTH TWICE A  tablet 1    valACYclovir (VALTREX) 500 MG tablet TAKE 1 TABLET BY MOUTH EVERY DAY 90 tablet 1    raloxifene (EVISTA) 60 MG tablet TAKE 1 TABLET BY MOUTH EVERY DAY 90 tablet 1    Omega-3 Fatty Acids (FISH OIL) 1000 MG capsule Take 2 capsules by mouth daily      Calcium 500-2.5 MG-MCG CHEW 500 mg       No current facility-administered medications for this visit.       Allergies   Allergen Reactions    Penicillins      Other

## 2025-03-17 ENCOUNTER — HOSPITAL ENCOUNTER (OUTPATIENT)
Age: 70
Setting detail: SPECIMEN
Discharge: HOME | End: 2025-03-20
Payer: MEDICARE

## 2025-03-17 ENCOUNTER — HOSPITAL ENCOUNTER (OUTPATIENT)
Age: 70
Discharge: HOME | End: 2025-03-20
Payer: MEDICARE

## 2025-03-17 ENCOUNTER — HOSPITAL ENCOUNTER (OUTPATIENT)
Age: 70
Setting detail: SPECIMEN
Discharge: HOME | End: 2025-03-20

## 2025-03-17 VITALS — HEIGHT: 64 IN | WEIGHT: 173 LBS | BODY MASS INDEX: 29.53 KG/M2

## 2025-03-17 DIAGNOSIS — Z12.31 SCREENING MAMMOGRAM FOR BREAST CANCER: ICD-10-CM

## 2025-03-17 DIAGNOSIS — E78.5 HYPERLIPIDEMIA, UNSPECIFIED HYPERLIPIDEMIA TYPE: ICD-10-CM

## 2025-03-17 DIAGNOSIS — N18.31 STAGE 3A CHRONIC KIDNEY DISEASE (HCC): ICD-10-CM

## 2025-03-17 DIAGNOSIS — M67.441 GANGLION OF FLEXOR TENDON SHEATH OF RIGHT LITTLE FINGER: ICD-10-CM

## 2025-03-17 DIAGNOSIS — R73.9 HYPERGLYCEMIA: ICD-10-CM

## 2025-03-17 DIAGNOSIS — M79.89 SWELLING OF RIGHT LITTLE FINGER: ICD-10-CM

## 2025-03-17 DIAGNOSIS — D47.3 ESSENTIAL (HEMORRHAGIC) THROMBOCYTHEMIA: ICD-10-CM

## 2025-03-17 DIAGNOSIS — Z01.818 PREOP EXAMINATION: ICD-10-CM

## 2025-03-17 LAB
ALBUMIN SERPL-MCNC: 4 G/DL (ref 3.4–5)
ALBUMIN/GLOB SERPL: 1.1 (ref 0.8–1.7)
ALP SERPL-CCNC: 64 U/L (ref 45–117)
ALT SERPL-CCNC: 50 U/L (ref 13–56)
ANION GAP SERPL CALC-SCNC: 7 MMOL/L (ref 3–18)
AST SERPL W P-5'-P-CCNC: 26 U/L (ref 10–38)
BASOPHILS # BLD: 0.03 K/UL (ref 0–0.1)
BASOPHILS NFR BLD: 0.3 % (ref 0–2)
BILIRUB SERPL-MCNC: 0.8 MG/DL (ref 0.2–1)
BUN SERPL-MCNC: 17 MG/DL (ref 7–18)
BUN/CREAT SERPL: 14 (ref 12–20)
CA-I BLD-MCNC: 9.8 MG/DL (ref 8.5–10.1)
CHLORIDE SERPL-SCNC: 106 MMOL/L (ref 100–111)
CO2 SERPL-SCNC: 26 MMOL/L (ref 21–32)
CREAT SERPL-MCNC: 1.2 MG/DL (ref 0.6–1.3)
DIFFERENTIAL METHOD BLD: ABNORMAL
EOSINOPHIL # BLD: 0.45 K/UL (ref 0–0.4)
EOSINOPHIL NFR BLD: 4.9 % (ref 0–5)
ERYTHROCYTE [DISTWIDTH] IN BLOOD BY AUTOMATED COUNT: 12.3 % (ref 11.6–14.5)
GLOBULIN SER CALC-MCNC: 3.8 G/DL (ref 2–4)
GLUCOSE SERPL-MCNC: 82 MG/DL (ref 74–99)
HCT VFR BLD AUTO: 42 % (ref 35–45)
HGB BLD-MCNC: 14.2 G/DL (ref 12–16)
IMM GRANULOCYTES # BLD AUTO: 0.04 K/UL (ref 0–0.04)
IMM GRANULOCYTES NFR BLD AUTO: 0.4 % (ref 0–0.5)
LYMPHOCYTES # BLD: 3.66 K/UL (ref 0.9–3.6)
LYMPHOCYTES NFR BLD: 39.8 % (ref 21–52)
MCH RBC QN AUTO: 31.6 PG (ref 24–34)
MCHC RBC AUTO-ENTMCNC: 33.8 G/DL (ref 31–37)
MCV RBC AUTO: 93.3 FL (ref 78–100)
MONOCYTES # BLD: 0.66 K/UL (ref 0.05–1.2)
MONOCYTES NFR BLD: 7.2 % (ref 3–10)
NEUTS SEG # BLD: 4.35 K/UL (ref 1.8–8)
NEUTS SEG NFR BLD: 47.4 % (ref 40–73)
NRBC # BLD: 0 K/UL (ref 0–0.01)
NRBC BLD-RTO: 0 PER 100 WBC
PLATELET # BLD AUTO: 365 K/UL (ref 135–420)
PMV BLD AUTO: 10.7 FL (ref 9.2–11.8)
POTASSIUM SERPL-SCNC: 3.9 MMOL/L (ref 3.5–5.5)
PROT SERPL-MCNC: 7.8 G/DL (ref 6.4–8.2)
RBC # BLD AUTO: 4.5 M/UL (ref 4.2–5.3)
SODIUM SERPL-SCNC: 139 MMOL/L (ref 136–145)
WBC # BLD AUTO: 9.2 K/UL (ref 4.6–13.2)

## 2025-03-17 PROCEDURE — 77063 BREAST TOMOSYNTHESIS BI: CPT

## 2025-03-17 PROCEDURE — 80053 COMPREHEN METABOLIC PANEL: CPT

## 2025-03-17 PROCEDURE — 93005 ELECTROCARDIOGRAM TRACING: CPT

## 2025-03-17 PROCEDURE — 36415 COLL VENOUS BLD VENIPUNCTURE: CPT

## 2025-03-17 PROCEDURE — 85025 COMPLETE CBC W/AUTO DIFF WBC: CPT

## 2025-03-17 PROCEDURE — 83036 HEMOGLOBIN GLYCOSYLATED A1C: CPT

## 2025-03-17 PROCEDURE — 80061 LIPID PANEL: CPT

## 2025-03-18 DIAGNOSIS — N18.31 STAGE 3A CHRONIC KIDNEY DISEASE (HCC): Primary | ICD-10-CM

## 2025-03-18 LAB
CHOLEST SERPL-MCNC: 177 MG/DL
EKG ATRIAL RATE: 86 BPM
EKG DIAGNOSIS: NORMAL
EKG P AXIS: 54 DEGREES
EKG P-R INTERVAL: 158 MS
EKG Q-T INTERVAL: 360 MS
EKG QRS DURATION: 68 MS
EKG QTC CALCULATION (BAZETT): 430 MS
EKG R AXIS: 22 DEGREES
EKG T AXIS: 40 DEGREES
EKG VENTRICULAR RATE: 86 BPM
EST. AVERAGE GLUCOSE BLD GHB EST-MCNC: 114 MG/DL
HBA1C MFR BLD: 5.6 % (ref 4.2–5.6)
HDLC SERPL-MCNC: 51 MG/DL (ref 40–60)
HDLC SERPL: 3.5 (ref 0–5)
LDLC SERPL CALC-MCNC: 89.4 MG/DL (ref 0–100)
LIPID PANEL: ABNORMAL
TRIGL SERPL-MCNC: 183 MG/DL
VLDLC SERPL CALC-MCNC: 36.6 MG/DL

## 2025-03-20 ENCOUNTER — RESULTS FOLLOW-UP (OUTPATIENT)
Age: 70
End: 2025-03-20

## 2025-03-30 DIAGNOSIS — M85.80 OTHER SPECIFIED DISORDERS OF BONE DENSITY AND STRUCTURE, UNSPECIFIED SITE: ICD-10-CM

## 2025-03-31 RX ORDER — RALOXIFENE HYDROCHLORIDE 60 MG/1
60 TABLET, FILM COATED ORAL DAILY
Qty: 90 TABLET | Refills: 1 | Status: SHIPPED | OUTPATIENT
Start: 2025-03-31

## 2025-03-31 NOTE — TELEPHONE ENCOUNTER
Last seen 2/13/2025   Last labs 3/17/2025  Last filled  8/19/2024  Next appointment 8/13/2025     Lab Results   Component Value Date     03/17/2025    K 3.9 03/17/2025     03/17/2025    CO2 26 03/17/2025    BUN 17 03/17/2025    CREATININE 1.20 03/17/2025    GLUCOSE 82 03/17/2025    CALCIUM 9.8 03/17/2025    BILITOT 0.8 03/17/2025    ALKPHOS 64 03/17/2025    AST 26 03/17/2025    ALT 50 03/17/2025    LABGLOM 49 (L) 03/17/2025    GFRAA 54 (L) 06/01/2022    AGRATIO 1.0 06/01/2022    GLOB 3.8 03/17/2025

## 2025-04-02 ENCOUNTER — OFFICE VISIT (OUTPATIENT)
Age: 70
End: 2025-04-02
Payer: MEDICARE

## 2025-04-02 VITALS
SYSTOLIC BLOOD PRESSURE: 120 MMHG | HEART RATE: 85 BPM | WEIGHT: 175.6 LBS | HEIGHT: 64 IN | OXYGEN SATURATION: 95 % | BODY MASS INDEX: 29.98 KG/M2 | DIASTOLIC BLOOD PRESSURE: 74 MMHG

## 2025-04-02 DIAGNOSIS — N18.31 STAGE 3A CHRONIC KIDNEY DISEASE (HCC): Primary | ICD-10-CM

## 2025-04-02 PROCEDURE — 1126F AMNT PAIN NOTED NONE PRSNT: CPT | Performed by: INTERNAL MEDICINE

## 2025-04-02 PROCEDURE — 1123F ACP DISCUSS/DSCN MKR DOCD: CPT | Performed by: INTERNAL MEDICINE

## 2025-04-02 PROCEDURE — 1159F MED LIST DOCD IN RCRD: CPT | Performed by: INTERNAL MEDICINE

## 2025-04-02 PROCEDURE — 99213 OFFICE O/P EST LOW 20 MIN: CPT | Performed by: INTERNAL MEDICINE

## 2025-04-02 NOTE — PROGRESS NOTES
1. Have you been to the ER, urgent care clinic since your last visit?  Hospitalized since your last visit?No    2. Have you seen or consulted any other health care providers outside of the [facility] since your last visit?  Include any pap smears or colon screening. Yes [facility]

## 2025-04-02 NOTE — PROGRESS NOTES
Nephrology f/u          Patient: Jennifer Merida MRN: [id number]  SSN: [id number]    YOB: 1955  Age: 69 y.o.  Sex: female      Subjective:   Reason for the follow-up.  Elevated serum creatinine/chronic kidney disease stage IIIa  History of present illness.  The patient is 69-year-old woman, who looks much younger than her stated age, with underlying history of hypertension and CKD who is here for a follow-up visit.  She used to follow with Dr. Kurtz prior to this.  She denies any complaints at today's visit   no abdominal pain nausea vomiting or loose stools.  No voiding vision gross hematuria.  No lower extremity swelling.  No chest complaints.  Labs done prior to this visit show creatinine of 1.2.  Creatinine was 1.1 prior to this 6 months ago    History reviewed. No pertinent past medical history.  Past Surgical History:   Procedure Laterality Date    HYSTERECTOMY (CERVIX STATUS UNKNOWN)  2009      Family History   Problem Relation Age of Onset    Cancer Mother     Dementia Mother     Breast Cancer Mother 80    Glaucoma Mother     Heart Disease Father     Breast Cancer Maternal Grandmother 60     Social History     Tobacco Use    Smoking status: Some Days     Types: Cigars    Smokeless tobacco: Never   Substance Use Topics    Alcohol use: Yes     Alcohol/week: 1.0 standard drink of alcohol     Types: 1 Cans of beer per week      Current Outpatient Medications   Medication Sig Dispense Refill    raloxifene (EVISTA) 60 MG tablet TAKE 1 TABLET BY MOUTH EVERY DAY 90 tablet 1    sertraline (ZOLOFT) 25 MG tablet TAKE 1 TABLET BY MOUTH EVERY DAY 90 tablet 2    mirabegron (MYRBETRIQ) 25 MG TB24 TAKE 1 TABLET BY MOUTH EVERY DAY 90 tablet 0    ezetimibe (ZETIA) 10 MG tablet TAKE 1 TABLET BY MOUTH EVERY DAY 90 tablet 2    gemfibrozil (LOPID) 600 MG tablet TAKE 1 TABLET BY MOUTH TWICE A  tablet 1    valACYclovir (VALTREX) 500 MG tablet TAKE 1 TABLET BY MOUTH EVERY DAY 90 tablet 1

## 2025-04-04 ENCOUNTER — PREP FOR PROCEDURE (OUTPATIENT)
Age: 70
End: 2025-04-04

## 2025-04-04 DIAGNOSIS — R22.31 MASS OF RIGHT FINGER: ICD-10-CM

## 2025-04-04 DIAGNOSIS — M79.89 SWELLING OF RIGHT LITTLE FINGER: ICD-10-CM

## 2025-04-07 RX ORDER — OMEGA-3S/DHA/EPA/FISH OIL/D3 300MG-1000
400 CAPSULE ORAL DAILY
COMMUNITY

## 2025-04-07 RX ORDER — MAGNESIUM GLUCONATE 27 MG(500)
250 TABLET ORAL 2 TIMES DAILY
COMMUNITY

## 2025-04-07 NOTE — PERIOP NOTE
Instructions for your surgery at [facility]      Today's Date:  4/7/2025      Patient's Name:  Jennifer Merida           Surgery Date:  4/22              Please enter the main entrance of the hospital and check-in at the front security desk located in the lobby. They will direct you to the area to report for your surgery.     Do NOT eat or drink anything, including candy, gum, or ice chips after midnight prior to your surgery, unless you have specific instructions from your surgeon or anesthesia provider to do so.  Brush your teeth before coming to the hospital. You may swish with water, but do not swallow.  No smoking/Vaping/E-Cigarettes 24 hours prior to the day of surgery.  No alcohol 24 hours prior to the day of surgery.  No recreational drugs for one week prior to the day of surgery.  Bring Photo ID, Insurance information, and Co-pay if required on day of surgery.  Bring in pertinent legal documents, such as, Medical Power of , DNR, Advance Directive, etc.  Leave all valuables, including money/purse, weapons at home.  Remove all jewelry, including ALL body piercings, nail polish, acrylic nails, and makeup (including mascara); no lotions, powders, deodorant, or perfume/cologne/after shave on the skin.  Follow instruction for Hibiclens washes and CHG wipes from surgeon's office.   Glasses and dentures may be worn to the hospital. They must be removed prior to surgery. Please bring case/container for glasses or dentures.   Contact lenses should not be worn on day of surgery.   Call your doctor's office if symptoms of a cold or illness develop within 24-48 hours prior to your surgery.  Call your doctor's office if you have any questions concerning insurance or co-pays.  15. AN ADULT (relative or friend 18 years or older) MUST DRIVE YOU HOME AFTER YOUR SURGERY.  16. Please make arrangements for a responsible adult (18 years or older) to be with you for 24 hours after your

## 2025-04-10 DIAGNOSIS — B00.9 HERPESVIRAL INFECTION, UNSPECIFIED: ICD-10-CM

## 2025-04-10 RX ORDER — VALACYCLOVIR HYDROCHLORIDE 500 MG/1
500 TABLET, FILM COATED ORAL DAILY
Qty: 90 TABLET | Refills: 1 | Status: SHIPPED | OUTPATIENT
Start: 2025-04-10

## 2025-04-10 NOTE — TELEPHONE ENCOUNTER
Last seen 2/13/2025   Last labs 3/17/2025  Last filled  10/7/2024  Next appointment 8/13/2025     Lab Results   Component Value Date     03/17/2025    K 3.9 03/17/2025     03/17/2025    CO2 26 03/17/2025    BUN 17 03/17/2025    CREATININE 1.20 03/17/2025    GLUCOSE 82 03/17/2025    CALCIUM 9.8 03/17/2025    BILITOT 0.8 03/17/2025    ALKPHOS 64 03/17/2025    AST 26 03/17/2025    ALT 50 03/17/2025    LABGLOM 49 (L) 03/17/2025    GFRAA 54 (L) 06/01/2022    AGRATIO 1.0 06/01/2022    GLOB 3.8 03/17/2025

## 2025-04-11 NOTE — TELEPHONE ENCOUNTER
Last seen 2/13/2025   Last labs 3/17/2025  Last filled  12/11/2024  Next appointment 8/13/2025     Lab Results   Component Value Date     03/17/2025    K 3.9 03/17/2025     03/17/2025    CO2 26 03/17/2025    BUN 17 03/17/2025    CREATININE 1.20 03/17/2025    GLUCOSE 82 03/17/2025    CALCIUM 9.8 03/17/2025    BILITOT 0.8 03/17/2025    ALKPHOS 64 03/17/2025    AST 26 03/17/2025    ALT 50 03/17/2025    LABGLOM 49 (L) 03/17/2025    GFRAA 54 (L) 06/01/2022    AGRATIO 1.0 06/01/2022    GLOB 3.8 03/17/2025

## 2025-04-14 RX ORDER — MIRABEGRON 25 MG/1
25 TABLET, FILM COATED, EXTENDED RELEASE ORAL DAILY
Qty: 90 TABLET | Refills: 0 | Status: SHIPPED | OUTPATIENT
Start: 2025-04-14

## 2025-04-21 ENCOUNTER — ANESTHESIA EVENT (OUTPATIENT)
Facility: HOSPITAL | Age: 70
End: 2025-04-21
Payer: MEDICARE

## 2025-04-21 NOTE — H&P
[facility]    Hand surgery H&P note      2025  8:07 AM  Chart reviewed.    Patient: Jennifer Merida MRN: [id number]  SSN: [id number]    YOB: 1955  Age: 69 y.o.  Sex: female      Primary attending: Ernesto Kate DO   Chief Complaint: Right little finger mass    Interval events:     No changes    Subjective:     Jennifer Merida is a 69 y.o. female who is scheduled for Procedure(s):  Right little finger volar mass excision for right little finger mass    3/12/2025 HPI: Patient presents today for follow-up wanting to set up surgery for her right little finger mass. She reports no pain but discomfort with daily activities.     Objective:   Vital signs:   Patient Vitals for the past 24 hrs:   Temp Pulse Resp BP SpO2   25 0741 97.7 °F (36.5 °C) 77 18 118/61 96 %     Temp (24hrs), Av.7 °F (36.5 °C), Min:97.7 °F (36.5 °C), Max:97.7 °F (36.5 °C)      Physical Exam:   Vitals and nursing note reviewed.   Constitutional:       General: She is not in acute distress.     Appearance: Normal appearance. She is not ill-appearing, toxic-appearing or diaphoretic.   HENT:      Head: Normocephalic and atraumatic.      Nose: Nose normal.      Mouth/Throat:      Mouth: Mucous membranes are moist.   Eyes:      Extraocular Movements: Extraocular movements intact.      Pupils: Pupils are equal, round, and reactive to light.   Cardiovascular:      Pulses: Normal pulses.   Pulmonary:      Effort: Pulmonary effort is normal. No respiratory distress.   Abdominal:      General: Abdomen is flat. There is no distension.   Musculoskeletal:         General: Tenderness present. No swelling, deformity or signs of injury. Normal range of motion.      Cervical back: Normal range of motion and neck supple.      Right lower leg: No edema.      Left lower leg: No edema.   Skin:     General: Skin is warm and dry.      Capillary Refill: Capillary refill takes less than 2

## 2025-04-21 NOTE — DISCHARGE INSTRUCTIONS
Do not remove dressing for 3 days    Keep dressing clean and dry. Cover for showering.    Ice the wound/dressing multiple times per day to help with swelling    Elevate operative wound/dressing    Take post operative medications as indicated on the prescription label    Begin moving fingers immediately after surgery and continue moving fingers into a fist every hour that you're awake     You may experience significant bruising after surgery and it may extend up to your elbow. This is very common.     The following only applies to you if you are instructed to remove your bandage after 3 days:   Do not apply Peroxide, Neosporin, Vitamin E oil, or any ointment to the area.   Please wash the incisions gently with ANTI-BACTERIAL SOAP and WATER only.   Do not submerge in bath tub or dirty dish water  Cover incision with a band-aid as needed, but not all the time  Ok to shower as normal after bandage comes off    Reasons to call our office:  You remove the bandage (only if instructed) and notice the incision(s) is/are red, hot, warm, draining yellow/green/brown and appears infected.  You are having an allergic reaction to post operative medications   Your pain is significantly uncontrolled on the prescribed post operative regimen       DISCHARGE SUMMARY from Nurse    PATIENT INSTRUCTIONS:    After general anesthesia or intravenous sedation, for 24 hours or while taking prescription Narcotics:  Limit your activities  Do not drive and operate hazardous machinery  Do not make important personal or business decisions  Do  not drink alcoholic beverages  If you have not urinated within 8 hours after discharge, please contact your surgeon on call.    Report the following to your surgeon:  Excessive pain, swelling, redness or odor of or around the surgical area  Temperature over 100.5  Nausea and vomiting lasting longer than 4 hours or if unable to take medications  Any signs of decreased circulation or nerve impairment to

## 2025-04-22 ENCOUNTER — HOSPITAL ENCOUNTER (OUTPATIENT)
Facility: HOSPITAL | Age: 70
Setting detail: OUTPATIENT SURGERY
Discharge: HOME | End: 2025-04-22
Attending: ORTHOPAEDIC SURGERY | Admitting: ORTHOPAEDIC SURGERY
Payer: MEDICARE

## 2025-04-22 ENCOUNTER — ANESTHESIA (OUTPATIENT)
Facility: HOSPITAL | Age: 70
End: 2025-04-22
Payer: MEDICARE

## 2025-04-22 VITALS
HEIGHT: 64 IN | WEIGHT: 174.8 LBS | RESPIRATION RATE: 12 BRPM | BODY MASS INDEX: 29.84 KG/M2 | OXYGEN SATURATION: 96 % | SYSTOLIC BLOOD PRESSURE: 122 MMHG | TEMPERATURE: 98.1 F | DIASTOLIC BLOOD PRESSURE: 62 MMHG | HEART RATE: 75 BPM

## 2025-04-22 DIAGNOSIS — R22.31 MASS OF RIGHT FINGER: ICD-10-CM

## 2025-04-22 DIAGNOSIS — M79.89 SWELLING OF RIGHT LITTLE FINGER: Primary | ICD-10-CM

## 2025-04-22 PROCEDURE — 2709999900 HC NON-CHARGEABLE SUPPLY: Performed by: ORTHOPAEDIC SURGERY

## 2025-04-22 PROCEDURE — 7100000001 HC PACU RECOVERY - ADDTL 15 MIN: Performed by: ORTHOPAEDIC SURGERY

## 2025-04-22 PROCEDURE — 6360000002 HC RX W HCPCS: Performed by: ORTHOPAEDIC SURGERY

## 2025-04-22 PROCEDURE — 3700000001 HC ADD 15 MINUTES (ANESTHESIA): Performed by: ORTHOPAEDIC SURGERY

## 2025-04-22 PROCEDURE — 7100000011 HC PHASE II RECOVERY - ADDTL 15 MIN: Performed by: ORTHOPAEDIC SURGERY

## 2025-04-22 PROCEDURE — 3600000002 HC SURGERY LEVEL 2 BASE: Performed by: ORTHOPAEDIC SURGERY

## 2025-04-22 PROCEDURE — 3600000012 HC SURGERY LEVEL 2 ADDTL 15MIN: Performed by: ORTHOPAEDIC SURGERY

## 2025-04-22 PROCEDURE — 6360000002 HC RX W HCPCS: Performed by: NURSE ANESTHETIST, CERTIFIED REGISTERED

## 2025-04-22 PROCEDURE — 2580000003 HC RX 258: Performed by: NURSE ANESTHETIST, CERTIFIED REGISTERED

## 2025-04-22 PROCEDURE — 88304 TISSUE EXAM BY PATHOLOGIST: CPT

## 2025-04-22 PROCEDURE — 6370000000 HC RX 637 (ALT 250 FOR IP): Performed by: NURSE ANESTHETIST, CERTIFIED REGISTERED

## 2025-04-22 PROCEDURE — 7100000010 HC PHASE II RECOVERY - FIRST 15 MIN: Performed by: ORTHOPAEDIC SURGERY

## 2025-04-22 PROCEDURE — 7100000000 HC PACU RECOVERY - FIRST 15 MIN: Performed by: ORTHOPAEDIC SURGERY

## 2025-04-22 PROCEDURE — 3700000000 HC ANESTHESIA ATTENDED CARE: Performed by: ORTHOPAEDIC SURGERY

## 2025-04-22 PROCEDURE — 88305 TISSUE EXAM BY PATHOLOGIST: CPT

## 2025-04-22 RX ORDER — FENTANYL CITRATE 50 UG/ML
INJECTION, SOLUTION INTRAMUSCULAR; INTRAVENOUS
Status: DISCONTINUED | OUTPATIENT
Start: 2025-04-22 | End: 2025-04-22 | Stop reason: SDUPTHER

## 2025-04-22 RX ORDER — ONDANSETRON 2 MG/ML
4 INJECTION INTRAMUSCULAR; INTRAVENOUS
Status: DISCONTINUED | OUTPATIENT
Start: 2025-04-22 | End: 2025-04-22 | Stop reason: HOSPADM

## 2025-04-22 RX ORDER — SODIUM CHLORIDE, SODIUM LACTATE, POTASSIUM CHLORIDE, CALCIUM CHLORIDE 600; 310; 30; 20 MG/100ML; MG/100ML; MG/100ML; MG/100ML
INJECTION, SOLUTION INTRAVENOUS CONTINUOUS
Status: DISCONTINUED | OUTPATIENT
Start: 2025-04-22 | End: 2025-04-22 | Stop reason: HOSPADM

## 2025-04-22 RX ORDER — LIDOCAINE HYDROCHLORIDE 20 MG/ML
INJECTION, SOLUTION EPIDURAL; INFILTRATION; INTRACAUDAL; PERINEURAL
Status: DISCONTINUED | OUTPATIENT
Start: 2025-04-22 | End: 2025-04-22 | Stop reason: SDUPTHER

## 2025-04-22 RX ORDER — LIDOCAINE HYDROCHLORIDE 10 MG/ML
1 INJECTION, SOLUTION EPIDURAL; INFILTRATION; INTRACAUDAL; PERINEURAL
Status: DISCONTINUED | OUTPATIENT
Start: 2025-04-22 | End: 2025-04-22 | Stop reason: HOSPADM

## 2025-04-22 RX ORDER — SODIUM CHLORIDE 0.9 % (FLUSH) 0.9 %
5-40 SYRINGE (ML) INJECTION EVERY 12 HOURS SCHEDULED
Status: DISCONTINUED | OUTPATIENT
Start: 2025-04-22 | End: 2025-04-22 | Stop reason: HOSPADM

## 2025-04-22 RX ORDER — NALOXONE HYDROCHLORIDE 0.4 MG/ML
INJECTION, SOLUTION INTRAMUSCULAR; INTRAVENOUS; SUBCUTANEOUS PRN
Status: DISCONTINUED | OUTPATIENT
Start: 2025-04-22 | End: 2025-04-22 | Stop reason: HOSPADM

## 2025-04-22 RX ORDER — PROPOFOL 10 MG/ML
INJECTION, EMULSION INTRAVENOUS
Status: DISCONTINUED | OUTPATIENT
Start: 2025-04-22 | End: 2025-04-22 | Stop reason: SDUPTHER

## 2025-04-22 RX ORDER — SODIUM CHLORIDE 0.9 % (FLUSH) 0.9 %
5-40 SYRINGE (ML) INJECTION PRN
Status: DISCONTINUED | OUTPATIENT
Start: 2025-04-22 | End: 2025-04-22 | Stop reason: HOSPADM

## 2025-04-22 RX ORDER — ACETAMINOPHEN 325 MG/1
650 TABLET ORAL
Status: DISCONTINUED | OUTPATIENT
Start: 2025-04-22 | End: 2025-04-22 | Stop reason: HOSPADM

## 2025-04-22 RX ORDER — PROCHLORPERAZINE EDISYLATE 5 MG/ML
5 INJECTION INTRAMUSCULAR; INTRAVENOUS
Status: DISCONTINUED | OUTPATIENT
Start: 2025-04-22 | End: 2025-04-22 | Stop reason: HOSPADM

## 2025-04-22 RX ORDER — HYDROCODONE BITARTRATE AND ACETAMINOPHEN 5; 325 MG/1; MG/1
1 TABLET ORAL EVERY 6 HOURS PRN
Qty: 12 TABLET | Refills: 0 | Status: SHIPPED | OUTPATIENT
Start: 2025-04-22 | End: 2025-04-25

## 2025-04-22 RX ORDER — CLINDAMYCIN PHOSPHATE 900 MG/50ML
900 INJECTION, SOLUTION INTRAVENOUS
Status: COMPLETED | OUTPATIENT
Start: 2025-04-22 | End: 2025-04-22

## 2025-04-22 RX ORDER — PROMETHAZINE HYDROCHLORIDE 12.5 MG/1
12.5 TABLET ORAL ONCE
Status: COMPLETED | OUTPATIENT
Start: 2025-04-22 | End: 2025-04-22

## 2025-04-22 RX ORDER — KETOROLAC TROMETHAMINE 15 MG/ML
INJECTION, SOLUTION INTRAMUSCULAR; INTRAVENOUS
Status: DISCONTINUED | OUTPATIENT
Start: 2025-04-22 | End: 2025-04-22 | Stop reason: SDUPTHER

## 2025-04-22 RX ORDER — MIDAZOLAM HYDROCHLORIDE 1 MG/ML
INJECTION, SOLUTION INTRAMUSCULAR; INTRAVENOUS
Status: DISCONTINUED | OUTPATIENT
Start: 2025-04-22 | End: 2025-04-22 | Stop reason: SDUPTHER

## 2025-04-22 RX ORDER — SODIUM CHLORIDE 9 MG/ML
INJECTION, SOLUTION INTRAVENOUS PRN
Status: DISCONTINUED | OUTPATIENT
Start: 2025-04-22 | End: 2025-04-22 | Stop reason: HOSPADM

## 2025-04-22 RX ORDER — FENTANYL CITRATE 50 UG/ML
25 INJECTION, SOLUTION INTRAMUSCULAR; INTRAVENOUS EVERY 5 MIN PRN
Status: DISCONTINUED | OUTPATIENT
Start: 2025-04-22 | End: 2025-04-22 | Stop reason: HOSPADM

## 2025-04-22 RX ORDER — FENTANYL CITRATE 50 UG/ML
50 INJECTION, SOLUTION INTRAMUSCULAR; INTRAVENOUS EVERY 5 MIN PRN
Status: DISCONTINUED | OUTPATIENT
Start: 2025-04-22 | End: 2025-04-22 | Stop reason: HOSPADM

## 2025-04-22 RX ADMIN — CLINDAMYCIN PHOSPHATE 900 MG: 900 INJECTION, SOLUTION INTRAVENOUS at 09:25

## 2025-04-22 RX ADMIN — KETOROLAC TROMETHAMINE 15 MG: 15 INJECTION, SOLUTION INTRAMUSCULAR; INTRAVENOUS at 09:45

## 2025-04-22 RX ADMIN — MIDAZOLAM 1 MG: 1 INJECTION, SOLUTION INTRAMUSCULAR; INTRAVENOUS at 09:19

## 2025-04-22 RX ADMIN — PROPOFOL 75 MCG/KG/MIN: 10 INJECTION, EMULSION INTRAVENOUS at 09:23

## 2025-04-22 RX ADMIN — PROPOFOL 60 MG: 10 INJECTION, EMULSION INTRAVENOUS at 09:27

## 2025-04-22 RX ADMIN — FENTANYL CITRATE 50 MCG: 50 INJECTION INTRAMUSCULAR; INTRAVENOUS at 09:23

## 2025-04-22 RX ADMIN — LIDOCAINE HYDROCHLORIDE 50 MG: 20 INJECTION, SOLUTION EPIDURAL; INFILTRATION; INTRACAUDAL; PERINEURAL at 09:25

## 2025-04-22 RX ADMIN — SODIUM CHLORIDE, SODIUM LACTATE, POTASSIUM CHLORIDE, AND CALCIUM CHLORIDE: 600; 310; 30; 20 INJECTION, SOLUTION INTRAVENOUS at 09:19

## 2025-04-22 RX ADMIN — PROMETHAZINE HYDROCHLORIDE 12.5 MG: 12.5 TABLET ORAL at 08:08

## 2025-04-22 ASSESSMENT — PAIN SCALES - GENERAL
PAINLEVEL_OUTOF10: 0
PAINLEVEL_OUTOF10: 0

## 2025-04-22 ASSESSMENT — PAIN - FUNCTIONAL ASSESSMENT: PAIN_FUNCTIONAL_ASSESSMENT: 0-10

## 2025-04-22 NOTE — ANESTHESIA POSTPROCEDURE EVALUATION
Department of Anesthesiology  Postprocedure Note    Patient: Jennifer Merida  MRN: [id number]  YOB: 1955  Date of evaluation: 4/22/2025    Procedure Summary       Date: 04/22/25 Room / Location: [facility] MAIN 03 / [facility] MAIN OR    Anesthesia Start: 0919 Anesthesia Stop: 0956    Procedure: Right little finger volar mass excision (Right: Hand) Diagnosis:       Mass of right finger      Swelling of right little finger      (Mass of right finger [R22.31])      (Swelling of right little finger [M79.89])    Surgeons: Ernesto Kate DO Responsible Provider: Harrison Hoffman MD    Anesthesia Type: MAC ASA Status: 2            Anesthesia Type: MAC    Gloria Phase I: Gloria Score: 10    Gloria Phase II: Gloria Score: 10    Anesthesia Post Evaluation    Patient location during evaluation: PACU  Patient participation: complete - patient participated  Level of consciousness: awake  Airway patency: patent  Nausea & Vomiting: no nausea  Cardiovascular status: hemodynamically stable  Respiratory status: acceptable  Hydration status: euvolemic  Pain management: adequate    No notable events documented.

## 2025-04-22 NOTE — ANESTHESIA PRE PROCEDURE
Department of Anesthesiology  Preprocedure Note       Name:  Jennifer Merida   Age:  69 y.o.  :  1955                                          MRN:  [id number]         Date:  2025      Surgeon: Surgeon(s):  Ernesto Kaet DO    Procedure: Procedure(s):  Right little finger volar mass excision    Medications prior to admission:   Prior to Admission medications    Medication Sig Start Date End Date Taking? Authorizing Provider   HYDROcodone-acetaminophen (NORCO) 5-325 MG per tablet Take 1 tablet by mouth every 6 hours as needed for Pain for up to 3 days. Max Daily Amount: 4 tablets 25 Yes Ernesto Kate DO   mirabegron (MYRBETRIQ) 25 MG TB24 TAKE 1 TABLET BY MOUTH EVERY DAY 25  Yes Geoffrey Waldron MD   valACYclovir (VALTREX) 500 MG tablet TAKE 1 TABLET BY MOUTH EVERY DAY 4/10/25  Yes Geoffrey Waldron MD   magnesium gluconate (MAGONATE) 500 MG tablet Take 0.5 tablets by mouth 2 times daily   Yes Etienne Ellington MD   cholecalciferol (VITAMIN D3) 400 UNIT TABS tablet Take 1 tablet by mouth daily   Yes Etienne Ellington MD   raloxifene (EVISTA) 60 MG tablet TAKE 1 TABLET BY MOUTH EVERY DAY 3/31/25  Yes Geoffrey Waldron MD   sertraline (ZOLOFT) 25 MG tablet TAKE 1 TABLET BY MOUTH EVERY DAY 24  Yes Geoffrey Waldron MD   ezetimibe (ZETIA) 10 MG tablet TAKE 1 TABLET BY MOUTH EVERY DAY 24  Yes Geoffrey Waldron MD   gemfibrozil (LOPID) 600 MG tablet TAKE 1 TABLET BY MOUTH TWICE A DAY 10/31/24  Yes Geoffrey Waldron MD   Omega-3 Fatty Acids (FISH OIL) 1000 MG capsule Take 2 capsules by mouth daily   Yes Etienne Ellington MD   Calcium 500-2.5 MG-MCG CHEW 500 mg 23  Yes Etienne Ellington MD       Current medications:    Current Facility-Administered Medications   Medication Dose Route Frequency Provider Last Rate Last Admin    lidocaine PF 1 % injection 1 mL  1 mL IntraDERmal Once PRN Teddy, Queenie E, APRN - CRNA        lactated ringers infusion

## 2025-04-22 NOTE — OP NOTE
Operative Note      Patient: Jennifer Merida  YOB: 1955  MRN: [id number]    Date of Procedure: 4/22/2025    Pre-Op Diagnosis Codes:      * Mass of right finger [R22.31]     * Swelling of right little finger [M79.89]    Post-Op Diagnosis: Post-Op Diagnosis Codes:     * Mass of right finger [R22.31]     * Swelling of right little finger [M79.89]       Procedure(s):  Right little finger volar mass excision    Surgeon(s):  Ernesto Kate DO    Assistant:   Surgical Assistant: Odilia Monge    Anesthesia: Monitor Anesthesia Care    Estimated Blood Loss (mL): Minimal    Complications: None    Specimens:   ID Type Source Tests Collected by Time Destination   A : Right Little Finger Volar Mass Tissue Finger SURGICAL PATHOLOGY Ernesto Kate DO 4/22/2025 0936        Implants:  * No implants in log *      Drains: * No LDAs found *    Findings:  Infection Present At Time Of Surgery (PATOS) (choose all levels that have infection present):  No infection present  Other Findings: Ganglion appearing mass adhered to the flexor sheath of the little finger  This procedure was not performed to treat primary cutaneous melanoma through wide local excision    Detailed Description of Procedure:     Indications for procedure been outlined in the perioperative documentation, most notably being not amenable to conservative treatment.    Informed consent was obtained from the patient.  The risks and benefits of the procedure were discussed with the patient.  They include but are not limited to neurovascular injury, tendon/ligamentous injury, blood loss, infection, incomplete excision of mass, recurrence of mass, incomplete relief of symptoms, need for further surgery, hematoma, neuroma, seroma, chronic pain, chronic stiffness, complications from anesthesia including death, and the possibility of nicci Covid.    After informed consent was obtained, the patient was taken back to the operative suite.  A tourniquet was

## 2025-04-22 NOTE — BRIEF OP NOTE
Brief Postoperative Note      Patient: Jennifer Merida  YOB: 1955  MRN: [id number]    Date of Procedure: 4/22/2025    Pre-Op Diagnosis Codes:      * Mass of right finger [R22.31]     * Swelling of right little finger [M79.89]    Post-Op Diagnosis: Post-Op Diagnosis Codes:     * Mass of right finger [R22.31]     * Swelling of right little finger [M79.89]       Procedure(s):  Right little finger volar mass excision    Surgeon(s):  Ernesto Kate DO    Assistant:  Surgical Assistant: Odilia Monge    Anesthesia: Monitor Anesthesia Care    Estimated Blood Loss (mL): Minimal    Complications: None    Specimens:   ID Type Source Tests Collected by Time Destination   A : Right Little Finger Volar Mass Tissue Finger SURGICAL PATHOLOGY Ernesto Kate DO 4/22/2025 0936        Implants:  * No implants in log *      Drains: * No LDAs found *    Findings:  Infection Present At Time Of Surgery (PATOS) (choose all levels that have infection present):  No infection present  Other Findings: ganglion appearing mass adhered to flexor sheath  This procedure was not performed to treat primary cutaneous melanoma through wide local excision    Electronically signed by Ernesto Kate DO on 4/22/2025 at 9:45 AM

## 2025-04-30 ENCOUNTER — PATIENT MESSAGE (OUTPATIENT)
Age: 70
End: 2025-04-30

## 2025-04-30 ENCOUNTER — TELEPHONE (OUTPATIENT)
Age: 70
End: 2025-04-30

## 2025-04-30 DIAGNOSIS — M67.441 GANGLION OF FLEXOR TENDON SHEATH OF RIGHT LITTLE FINGER: Primary | ICD-10-CM

## 2025-04-30 DIAGNOSIS — Z98.890 POST-OPERATIVE STATE: ICD-10-CM

## 2025-04-30 DIAGNOSIS — L76.82 POSTOPERATIVE COMPLICATION OF SKIN INVOLVING DRAINAGE FROM SURGICAL WOUND: ICD-10-CM

## 2025-04-30 RX ORDER — SULFAMETHOXAZOLE AND TRIMETHOPRIM 800; 160 MG/1; MG/1
1 TABLET ORAL 2 TIMES DAILY
Qty: 10 TABLET | Refills: 0 | Status: SHIPPED | OUTPATIENT
Start: 2025-04-30 | End: 2025-05-05

## 2025-04-30 NOTE — TELEPHONE ENCOUNTER
Patient states that her right hand pinky has a pin size puss spot beside the stitch and patient states that squeezed the spot and put alcohol on it     Patient is requesting a call back    Patient tel [phone]

## 2025-05-03 DIAGNOSIS — E78.5 HYPERLIPIDEMIA, UNSPECIFIED: ICD-10-CM

## 2025-05-05 RX ORDER — GEMFIBROZIL 600 MG/1
600 TABLET, FILM COATED ORAL 2 TIMES DAILY
Qty: 180 TABLET | Refills: 1 | Status: SHIPPED | OUTPATIENT
Start: 2025-05-05

## 2025-05-05 NOTE — TELEPHONE ENCOUNTER
Last seen 2/13/2025   Last labs 3/17/2025  Last filled   10/31/2024  Next appointment 8/13/2025     Lab Results   Component Value Date     03/17/2025    K 3.9 03/17/2025     03/17/2025    CO2 26 03/17/2025    BUN 17 03/17/2025    CREATININE 1.20 03/17/2025    GLUCOSE 82 03/17/2025    CALCIUM 9.8 03/17/2025    BILITOT 0.8 03/17/2025    ALKPHOS 64 03/17/2025    AST 26 03/17/2025    ALT 50 03/17/2025    LABGLOM 49 (L) 03/17/2025    GFRAA 54 (L) 06/01/2022    AGRATIO 1.0 06/01/2022    GLOB 3.8 03/17/2025

## 2025-05-07 ENCOUNTER — OFFICE VISIT (OUTPATIENT)
Age: 70
End: 2025-05-07

## 2025-05-07 VITALS — WEIGHT: 174 LBS | BODY MASS INDEX: 29.71 KG/M2 | HEIGHT: 64 IN

## 2025-05-07 DIAGNOSIS — Z98.890 POST-OPERATIVE STATE: ICD-10-CM

## 2025-05-07 DIAGNOSIS — M67.441 GANGLION OF FLEXOR TENDON SHEATH OF RIGHT LITTLE FINGER: Primary | ICD-10-CM

## 2025-05-07 DIAGNOSIS — Z98.890 S/P EXCISION OF GANGLION CYST: ICD-10-CM

## 2025-05-07 PROCEDURE — 99024 POSTOP FOLLOW-UP VISIT: CPT

## 2025-05-07 NOTE — PROGRESS NOTES
Jennifer Merida is a 69 y.o. female right handed retiree.  Worker's Compensation and legal considerations: none    Chief Complaint   Patient presents with    Post-Op Check     Right pinky     Pain Score:   0 - No pain    Subjective:     05/07/25 HPI: Patient presents today for a post operative visit approximately 2 weeks s/p right little finger mass excision on 4/22/2025 with Dr. Kate. Today, the patient reports to be doing well.  Postoperative course has been complicated by superficial wound infection that was treated with a short course of antibiotics.  She does report some hypersensitivity distal to the incision.    3/12/2025 HPI: Patient presents today for follow-up wanting to set up surgery for her right little finger mass.  She reports no pain but discomfort with daily activities.    1/8/2025 HPI: Patient presents today for right little finger MRI review.  She reports no pain today and reports that the size of the mass has not increased since her last visit.    Initial HPI: Patient presents today with complaints of a mass at the base of her right little finger.  She reports it to have been there for a few months and getting bigger as well as painful.    Date of onset: Early summer 2020  Injury: No  Prior Treatment:  right little finger mass excision  Contributory history: None    ROS: Review of Systems - General ROS: negative except HPI    Past Medical History:   Diagnosis Date    CKD (chronic kidney disease) stage 3, GFR 30-59 ml/min ([facility])     HSV infection     Osteopenia     Stress incontinence        Past Surgical History:   Procedure Laterality Date    HAND SURGERY Right 4/22/2025    Right little finger volar mass excision performed by Ernesto Kate DO at [facility] MAIN OR    HYSTERECTOMY (CERVIX STATUS UNKNOWN)  2009        Current Outpatient Medications   Medication Sig Dispense Refill    gemfibrozil (LOPID) 600 MG tablet TAKE 1 TABLET BY MOUTH TWICE A  tablet 1    mirabegron (MYRBETRIQ)

## 2025-06-09 ENCOUNTER — HOSPITAL ENCOUNTER (OUTPATIENT)
Facility: HOSPITAL | Age: 70
Setting detail: RECURRING SERIES
Discharge: HOME | End: 2025-06-12
Payer: MEDICARE

## 2025-06-09 PROCEDURE — 97535 SELF CARE MNGMENT TRAINING: CPT

## 2025-06-09 PROCEDURE — 97162 PT EVAL MOD COMPLEX 30 MIN: CPT

## 2025-06-09 NOTE — THERAPY EVALUATION
bladder function.   Status at evaluation: 2x/day  Long Term Goals: To be accomplished in 8 weeks   Patient will report frequency of leakage of no greater than 2 days/week in order to demonstrate improved bladder function.  Status at evaluation: 2x/day  2. Patient will report frequency of nocturia of no greater than 2x/week in order to improve sleep and QOL.   Status at evaluation: 1x/night  3. Patient will report at least 50% subjective improvement with leakage at rest in order to demonstrate improved pelvic floor endurance.   Status at evaluation: new over last several months    Frequency / Duration: Patient to be seen 1-2 times per week for 8 weeks  Frequency / Duration: Patient would benefit from skilled PT 1-2 times per week for 16 sessions as needed in this certification period.  Goals will be assigned and reassessed every 10 visits/ 30 days per Medicare guidelines      Patient/ Caregiver education and instruction: Diagnosis, prognosis, Proper Voiding Habits, Diet, Exercises, and Bladder Retraining    Certification Period: 6/9/25-8/8/25    Aye Clifford, PT 6/9/2025 2:11 PM    ________________________________________________________________________    I certify that the above Therapy Services are being furnished while the patient is under my care. I agree with the treatment plan and certify that this therapy is necessary.    Physician's Signature:____________Date:_________TIME:________     Valentina Suazo PA-C  ** Signature, Date and Time must be completed for valid certification **      Please sign and return to [facility]   [address] [place] [address] [phone] fax

## 2025-06-09 NOTE — PROGRESS NOTES
PF Daily Treatment Note    Patient Name: Jennifer Merida    Date: 2025    : 1955  Insurance: Payor: [employer] / Plan: AETNA MEDICARE-ADVANTAGE PPO / Product Type: Medicare /      Patient  verified yes     Visit #   Current / Total 1 16   Time   In / Out 2:41P 3:19P   Pain   In / Out 0/10 0/10   Subjective Functional Status/Changes: See poc       Treatment Area: Stress incontinence (female) (male)    SUBJECTIVE  Pain Level (0-10 scale):   []constant []intermittent []improving []worsening []no change since onset    Any medication changes, allergies to medications, adverse drug reactions, diagnosis change, or new procedure performed?: [x] No    [] Yes (see summary sheet for update)  Subjective functional status/changes:       Prior Level of Function: aquatics classes and walking; history of 2 vaginal deliveries; enjoys spending time with grandchildren   Comorbidities: history of hysterectomy              The Plan of Care and following information is based on the information from the initial evaluation.  Assessment/ key information: Patient is a 69-year-old right-handed female who presents to therapy with chief complaint of stress urinary incontinence. Patient reports leakage with coughing and sneezing when she started exercises at the [facility]. She does report leakage at rest which is newer. She reports frequency of leakage of 2x/day requiring wear of pantyliners. She reports ability to delay need to urinate of >1 hour. She reports frequency of leakage of 2x/day. Bowel movements reported as regular and comfortable. Patient reports frequency of urination of 5-8x/day and once at night (with increased urgency waking her). She reports fluid intake of 6-8 8oz glasses/day of mainly water. Exam reveals patient with min decreased right hip strength vs left. Positive Trendelenburg noted in right stance. Patient with min decreased hamstring flexibility and good form with diaphragmatic breathing. Patient

## 2025-06-18 ENCOUNTER — HOSPITAL ENCOUNTER (OUTPATIENT)
Facility: HOSPITAL | Age: 70
Setting detail: RECURRING SERIES
Discharge: HOME | End: 2025-06-21
Payer: MEDICARE

## 2025-06-18 PROCEDURE — 97535 SELF CARE MNGMENT TRAINING: CPT

## 2025-06-18 PROCEDURE — 97110 THERAPEUTIC EXERCISES: CPT

## 2025-06-18 NOTE — PROGRESS NOTES
PF Daily Treatment Note    Patient Name: Jennifer Merida    Date: 2025    : 1955  Insurance: Payor: [employer] / Plan: AETNA MEDICARE-ADVANTAGE PPO / Product Type: Medicare /      Patient  verified yes     Visit #   Current / Total 2 16   Time   In / Out 1:25P 2:03P   Pain   In / Out 0/10 0/10   Subjective Functional Status/Changes: Patient reports she has been fine since evaluation.She was mindful of trying to not rush into the bathroom. She has back pain from working and lifting a lot.        Treatment Area: Stress incontinence (female) (male)      Therapeutic Procedures:  Tx Min Billable or 1:1 Min (if diff from Tx Min) Procedure, Rationale, Specifics   10  [id number] Self Care/Home Management (timed):  improve patient knowledge and understanding of home injury/symptom/pain management, positioning, posture/ergonomics, and activity modification  to improve patient's ability to progress to PLOF and address remaining functional goals.  (see flow sheet as applicable)     Details if applicable:  importance of overall health for pelvic floor health; review of grounded positions  Rationale: Increase pelvic floor muscle strength, Improve quality of pelvic floor contractions, Decrease resting tone of the pelvic floor, Increase tissue extensibility of the pelvic floor muscles, Increase core strength, and Inhibit abnormal muscle activity in order to Increase urinary continence, Decrease urinary urgency, Increase ability to delay urination, Decrease frequency of urination, Decrease nocturia, and Improve ability to perform ADLs.    28  00755 Therapeutic Exercise (timed):  increase ROM, strength, coordination, balance, and proprioception to improve patient's ability to progress to PLOF and address remaining functional goals. (see flow sheet as applicable)     Details if applicable:  pelvic floor relaxation/stretching; knack technique with PF contraction with cough/sneeze/laugh  Rationale: Increase pelvic

## 2025-06-24 ENCOUNTER — HOSPITAL ENCOUNTER (OUTPATIENT)
Facility: HOSPITAL | Age: 70
Setting detail: RECURRING SERIES
Discharge: HOME | End: 2025-06-27
Payer: MEDICARE

## 2025-06-24 PROCEDURE — 97530 THERAPEUTIC ACTIVITIES: CPT

## 2025-06-24 PROCEDURE — 97535 SELF CARE MNGMENT TRAINING: CPT

## 2025-06-24 PROCEDURE — 97110 THERAPEUTIC EXERCISES: CPT

## 2025-06-24 NOTE — PROGRESS NOTES
PF DAILY TREATMENT NOTE       Patient Name: Jennifer Merida    Date: 2025    : 1955  Insurance: Payor: [employer]ICARE / Plan: AETNA MEDICARE-ADVANTAGE PPO / Product Type: Medicare /      Patient  verified yes     Visit #   Current / Total 3 16   Time   In / Out 2:51P 3:30P   Pain   In / Out 0/10 0/10   Subjective Functional Status/Changes: Patient reports she was able to do 2 min on stretching. The small of her back hurt going to bed. She thought it was the butterfly stretch. She has been swimming in the afternoon. She reports better awareness of symptoms and some improvement. She has just been swimming laps in the pool vs going to classes.      TREATMENT AREA =  Stress incontinence (female) (male)      OBJECTIVE      Therapeutic Procedures:  Tx Min Billable or 1:1 Min (if diff from Tx Min) Procedure, Rationale, Specifics   18  [id number] Therapeutic Exercise (timed):  increase ROM, strength, coordination, balance, and proprioception to improve patient's ability to progress to PLOF and address remaining functional goals. (see flow sheet as applicable)     Details if applicable:  corrected HEP to perform only 1-2 reps of butterfly stretch  Rationale: Increase pelvic floor muscle strength, Improve quality of pelvic floor contractions, Decrease resting tone of the pelvic floor, Increase tissue extensibility of the pelvic floor muscles, Increase core strength, and Inhibit abnormal muscle activity in order to Increase urinary continence, Decrease urinary urgency, Increase ability to delay urination, Decrease frequency of urination, Decrease nocturia, and Improve ability to perform ADLs.    8  80961 Therapeutic Activity (timed):  use of dynamic activities replicating functional movements to increase ROM, strength, coordination, balance, and proprioception in order to improve patient's ability to progress to PLOF and address remaining functional goals.  (see flow sheet as applicable)     Details if

## 2025-07-02 ENCOUNTER — HOSPITAL ENCOUNTER (OUTPATIENT)
Facility: HOSPITAL | Age: 70
Setting detail: RECURRING SERIES
Discharge: HOME | End: 2025-07-05
Payer: MEDICARE

## 2025-07-02 PROCEDURE — 97535 SELF CARE MNGMENT TRAINING: CPT

## 2025-07-02 PROCEDURE — 97110 THERAPEUTIC EXERCISES: CPT

## 2025-07-02 PROCEDURE — 97112 NEUROMUSCULAR REEDUCATION: CPT

## 2025-07-02 NOTE — PROGRESS NOTES
PF DAILY TREATMENT NOTE       Patient Name: Jennifer Merida    Date: 2025    : 1955  Insurance: Payor: [employer]ICARE / Plan: AETNA MEDICARE-ADVANTAGE PPO / Product Type: Medicare /      Patient  verified yes     Visit #   Current / Total 4 16   Time   In / Out 1:29P 2:17P   Pain   In / Out 0/10 0/10   Subjective Functional Status/Changes: Patient reports she did her exercises every day but .  She reports she does feel a little stronger. Doing the log roll technique has been helpful. Her walking ragini on her phone will say she has low stability and her  states she will not  her feet when she walks. She had her shoulder pop out a few years ago. She has tripped/fallen several times.     TREATMENT AREA =  Stress incontinence (female) (male)      OBJECTIVE        Therapeutic Procedures:  Tx Min Billable or 1:1 Min (if diff from Tx Min) Procedure, Rationale, Specifics   14  09813 Self Care/Home Management (timed):  improve patient knowledge and understanding of home injury/symptom/pain management, positioning, posture/ergonomics, home safety, activity modification, transfer techniques, and joint protection strategies  to improve patient's ability to progress to PLOF and address remaining functional goals.  (see flow sheet as applicable)     Details if applicable:  review of prognosis including 6-8 weeks for noted strength changes, 2-3 months for urge and leakage improvement respectively; review of POC including ability to hold/dc once patient has progressed with additional strength as long as continued symptom improvement noted; weakness/tightness contributing to imbalance and decreased joint stability  Rationale: Increase pelvic floor muscle strength, Improve quality of pelvic floor contractions, Decrease resting tone of the pelvic floor, Increase tissue extensibility of the pelvic floor muscles, Increase core strength, and Inhibit abnormal muscle activity in order to Increase

## 2025-07-09 ENCOUNTER — HOSPITAL ENCOUNTER (OUTPATIENT)
Facility: HOSPITAL | Age: 70
Setting detail: RECURRING SERIES
Discharge: HOME | End: 2025-07-12
Payer: MEDICARE

## 2025-07-09 PROCEDURE — 97530 THERAPEUTIC ACTIVITIES: CPT

## 2025-07-09 PROCEDURE — 97535 SELF CARE MNGMENT TRAINING: CPT

## 2025-07-09 PROCEDURE — 97110 THERAPEUTIC EXERCISES: CPT

## 2025-07-09 NOTE — PROGRESS NOTES
PF DAILY TREATMENT NOTE       Patient Name: Jennifer Merida    Date: 2025    : 1955  Insurance: Payor: [employer]ICARE / Plan: AETNA MEDICARE-ADVANTAGE PPO / Product Type: Medicare /      Patient  verified yes     Visit #   Current / Total 5 16   Time   In / Out 1:20P 2:00P   Pain   In / Out 0/10 0/10   Subjective Functional Status/Changes: Patient reports her knee popped doing louis pose. Shed iced and took Tylenol. She is not sore but is more \"aware\" of the left knee. She was unaware to put weight on it for 4 hours. She had some tightness behind her thigh and wonders if this pulling contributed to her falls. Her  reports she walks more fluidly. The leg lifts were weird. She had a left shoulder injury a while ago and it can still be painfu.      TREATMENT AREA =  Stress incontinence (female) (male)      OBJECTIVE      Therapeutic Procedures:  Tx Min Billable or 1:1 Min (if diff from Tx Min) Procedure, Rationale, Specifics   19  12318 Therapeutic Exercise (timed):  increase ROM, strength, coordination, balance, and proprioception to improve patient's ability to progress to PLOF and address remaining functional goals. (see flow sheet as applicable)   Reviewed holding activities which increase pain, reduced intensity of exercises as needed  Details if applicable:    Rationale: Increase pelvic floor muscle strength, Improve quality of pelvic floor contractions, Decrease resting tone of the pelvic floor, Increase tissue extensibility of the pelvic floor muscles, Increase core strength, and Inhibit abnormal muscle activity in order to Increase urinary continence, Decrease urinary urgency, Increase ability to delay urination, Decrease frequency of urination, Decrease nocturia, and Improve ability to perform ADLs    9  02761 Self Care/Home Management (timed):  improve patient knowledge and understanding of home injury/symptom/pain management, positioning, posture/ergonomics, home safety, and

## 2025-07-09 NOTE — THERAPY RECERTIFICATION
1x/night 6/24/25  3. Patient will report at least 50% subjective improvement with leakage at rest in order to demonstrate improved pelvic floor endurance.              Status last recertification/progress note: 25-35% improvement 7/9/25      Reporting Period: (date from last Prog Note/Eval to current Prog Note/Recert)  6/9/25/ - 7/29/25    RECOMMENDATIONS  Patient would benefit from the continuation of skilled rehab interventions, per initial Plan of Care or most recent Medicare Recert, for functional progress to achieving above stated clinically significant goals.    If you have any questions/comments please contact us directly.  Thank you for allowing us to assist in the care of your patient.    Aye Clifford, PT       7/9/2025       1:39 PM

## 2025-07-16 ENCOUNTER — HOSPITAL ENCOUNTER (OUTPATIENT)
Facility: HOSPITAL | Age: 70
Setting detail: RECURRING SERIES
Discharge: HOME | End: 2025-07-19
Payer: MEDICARE

## 2025-07-16 PROCEDURE — 97535 SELF CARE MNGMENT TRAINING: CPT

## 2025-07-16 PROCEDURE — 97112 NEUROMUSCULAR REEDUCATION: CPT

## 2025-07-16 PROCEDURE — 97110 THERAPEUTIC EXERCISES: CPT

## 2025-07-16 NOTE — PROGRESS NOTES
PF DAILY TREATMENT NOTE       Patient Name: Jennifer Merida    Date: 2025    : 1955  Insurance: Payor: [employer]ICARE / Plan: AETNA MEDICARE-ADVANTAGE PPO / Product Type: Medicare /      Patient  verified yes     Visit #   Current / Total 6 16   Time   In / Out 1:24P 2:04P   Pain   In / Out 0/10 0/10   Subjective Functional Status/Changes: Patient reports she had cryo treatment to her face which is still uncomfortable. The exercises were ok last visit. She no longer has been wearing a liner when she goes out. She has not been using the bands as she had a lot of things to do. Her knee is feeling better. She will get pain to her left ankle/heel and has been doing heel raises.      TREATMENT AREA =  Stress incontinence (female) (male)      OBJECTIVE        Therapeutic Procedures:  Tx Min Billable or 1:1 Min (if diff from Tx Min) Procedure, Rationale, Specifics   9  91723 Self Care/Home Management (timed):  improve patient knowledge and understanding of home injury/symptom/pain management, positioning, home safety, and activity modification  to improve patient's ability to progress to PLOF and address remaining functional goals.  (see flow sheet as applicable)     Details if applicable:  education on homunculus with increased sensitivity for different area  Rationale: Increase pelvic floor muscle strength, Improve quality of pelvic floor contractions, Decrease resting tone of the pelvic floor, Increase tissue extensibility of the pelvic floor muscles, Increase core strength, and Inhibit abnormal muscle activity in order to Increase urinary continence, Decrease urinary urgency, Increase ability to delay urination, Decrease frequency of urination, Decrease nocturia, and Improve ability to perform ADLs    17  62941 Therapeutic Exercise (timed):  increase ROM, strength, coordination, balance, and proprioception to improve patient's ability to progress to PLOF and address remaining functional goals.

## 2025-07-23 ENCOUNTER — HOSPITAL ENCOUNTER (OUTPATIENT)
Facility: HOSPITAL | Age: 70
Setting detail: RECURRING SERIES
Discharge: HOME | End: 2025-07-26
Payer: MEDICARE

## 2025-07-23 PROCEDURE — 97110 THERAPEUTIC EXERCISES: CPT

## 2025-07-23 PROCEDURE — 97530 THERAPEUTIC ACTIVITIES: CPT

## 2025-07-23 NOTE — PROGRESS NOTES
PF DAILY TREATMENT NOTE       Patient Name: Jennifer Merida    Date: 2025    : 1955  Insurance: Payor: [employer]ICARE / Plan: AETNA MEDICARE-ADVANTAGE PPO / Product Type: Medicare /      Patient  verified yes     Visit #   Current / Total 7 16   Time   In / Out 1:24P 2:03P   Pain   In / Out 0/10 0/10   Subjective Functional Status/Changes: Patient reports she has been good over the last week. She feels things are better every day. She has been debating on therapy. She has been doing her exercises everyday. She still wants to work on strengthening. She has been deshawn and making jams.      TREATMENT AREA =  Stress incontinence (female) (male)      OBJECTIVE         Therapeutic Procedures:  Tx Min Billable or 1:1 Min (if diff from Tx Min) Procedure, Rationale, Specifics   11  64702 Therapeutic Activity (timed):  use of dynamic activities replicating functional movements to increase ROM, strength, coordination, balance, and proprioception in order to improve patient's ability to progress to PLOF and address remaining functional goals.  (see flow sheet as applicable)     Details if applicable:  dynamic warm up and static cool down with strengthening performing longer hold times with static cool down and non ballistic motions with dynamic warmup  Rationale: Increase pelvic floor muscle strength, Improve quality of pelvic floor contractions, Decrease resting tone of the pelvic floor, Increase tissue extensibility of the pelvic floor muscles, Increase core strength, and Inhibit abnormal muscle activity in order to Increase urinary continence, Decrease urinary urgency, Increase ability to delay urination, Decrease frequency of urination, Decrease nocturia, and Improve ability to perform ADLs    28  16704 Therapeutic Exercise (timed):  increase ROM, strength, coordination, balance, and proprioception to improve patient's ability to progress to PLOF and address remaining functional goals. (see flow

## 2025-07-28 DIAGNOSIS — M85.80 OTHER SPECIFIED DISORDERS OF BONE DENSITY AND STRUCTURE, UNSPECIFIED SITE: ICD-10-CM

## 2025-07-28 DIAGNOSIS — B00.9 HERPESVIRAL INFECTION, UNSPECIFIED: ICD-10-CM

## 2025-07-29 RX ORDER — VALACYCLOVIR HYDROCHLORIDE 500 MG/1
500 TABLET, FILM COATED ORAL DAILY
Qty: 90 TABLET | Refills: 1 | Status: SHIPPED | OUTPATIENT
Start: 2025-07-29

## 2025-07-29 RX ORDER — MIRABEGRON 25 MG/1
25 TABLET, FILM COATED, EXTENDED RELEASE ORAL DAILY
Qty: 90 TABLET | Refills: 0 | Status: SHIPPED | OUTPATIENT
Start: 2025-07-29

## 2025-07-29 RX ORDER — RALOXIFENE HYDROCHLORIDE 60 MG/1
60 TABLET, FILM COATED ORAL DAILY
Qty: 90 TABLET | Refills: 1 | Status: SHIPPED | OUTPATIENT
Start: 2025-07-29

## 2025-07-29 NOTE — TELEPHONE ENCOUNTER
Last seen 2/13/2025   Last labs 3/17/2025  Last filled  4/10/2025 valACYclovir                    3/31/2025 raloxifene                    4/14/2025 mirabegron   Next appointment 9/17/2025     Lab Results   Component Value Date     03/17/2025    K 3.9 03/17/2025     03/17/2025    CO2 26 03/17/2025    BUN 17 03/17/2025    CREATININE 1.20 03/17/2025    GLUCOSE 82 03/17/2025    CALCIUM 9.8 03/17/2025    BILITOT 0.8 03/17/2025    ALKPHOS 64 03/17/2025    AST 26 03/17/2025    ALT 50 03/17/2025    LABGLOM 49 (L) 03/17/2025    GFRAA 54 (L) 06/01/2022    AGRATIO 1.0 06/01/2022    GLOB 3.8 03/17/2025

## 2025-07-30 ENCOUNTER — HOSPITAL ENCOUNTER (OUTPATIENT)
Facility: HOSPITAL | Age: 70
Setting detail: RECURRING SERIES
Discharge: HOME | End: 2025-08-02
Payer: MEDICARE

## 2025-07-30 PROCEDURE — 97535 SELF CARE MNGMENT TRAINING: CPT

## 2025-07-30 PROCEDURE — 97110 THERAPEUTIC EXERCISES: CPT

## 2025-07-30 NOTE — PROGRESS NOTES
PF DAILY TREATMENT NOTE       Patient Name: Jennifer Merida    Date: 2025    : 1955  Insurance: Payor: [employer] MEDICARE / Plan: AETNA MEDICARE-ADVANTAGE PPO / Product Type: Medicare /      Patient  verified yes     Visit #   Current / Total 8 16   Time   In / Out 1:23P 2:03P   Pain   In / Out 0/10 0/10   Subjective Functional Status/Changes: Patient reports she went back to swimming at the pool. She had a stomach ache for a couple days. She will normally go workout her . She has difficulty losing abdominal weight.      TREATMENT AREA =  Stress incontinence (female) (male)      OBJECTIVE    {[employer] Modality Grid:57897}     Therapeutic Procedures:  Tx Min Billable or 1:1 Min (if diff from Tx Min) Procedure, Rationale, Specifics     52124 Self Care/Home Management (timed):  improve patient knowledge and understanding of {[employer] SelfCare Rationale :72327}  to improve patient's ability to progress to PLOF and address remaining functional goals.  (see flow sheet as applicable)     Details if applicable:  continuing with HEP for continued strengthening; cardio and flexibility daily with   Rationale: Increase pelvic floor muscle strength, Improve quality of pelvic floor contractions, Decrease resting tone of the pelvic floor, Increase tissue extensibility of the pelvic floor muscles, Increase core strength, and Inhibit abnormal muscle activity in order to Increase urinary continence, Decrease urinary urgency, Increase ability to delay urination, Decrease frequency of urination, Decrease nocturia, and Improve ability to perform ADLs      58514 Therapeutic Exercise (timed):  increase ROM, strength, coordination, balance, and proprioception to improve patient's ability to progress to PLOF and address remaining functional goals. (see flow sheet as applicable)     Details if applicable:  core strengthening  Rationale: Increase pelvic floor muscle strength, Improve quality of pelvic floor

## (undated) DEVICE — ZIMMER® STERILE DISPOSABLE TOURNIQUET CUFF WITH PROTECTIVE SLEEVE AND PLC, DUAL PORT, SINGLE BLADDER, 18 IN. (46 CM)

## (undated) DEVICE — APPLICATOR MEDICATED 26 CC SOLUTION HI LT ORNG CHLORAPREP

## (undated) DEVICE — LIQUIBAND RAPID ADHESIVE 36/CS 0.8ML: Brand: MEDLINE

## (undated) DEVICE — HAND II-LF: Brand: MEDLINE INDUSTRIES, INC.

## (undated) DEVICE — GAUZE,SPONGE,4"X4",16PLY,STRL,LF,10/TRAY: Brand: MEDLINE

## (undated) DEVICE — SOLUTION IRRIG 1000ML 0.9% SOD CHL USP POUR PLAS BTL

## (undated) DEVICE — GOWN,SIRUS,FABRNF,2XL,18/CS: Brand: MEDLINE

## (undated) DEVICE — THREE-QUARTER SHEET: Brand: CONVERTORS

## (undated) DEVICE — BANDAGE,ELASTIC,ESMARK,STERILE,4"X9',LF: Brand: MEDLINE

## (undated) DEVICE — CORD ES L12FT BPLR FRCP

## (undated) DEVICE — PAD,NON-ADHERENT,3X8,STERILE,LF,1/PK: Brand: MEDLINE

## (undated) DEVICE — GLOVE SURG SZ 8 CRM LTX FREE POLYISOPRENE POLYMER BEAD ANTI

## (undated) DEVICE — STRAP,POSITIONING,KNEE/BODY,FOAM,4X60": Brand: MEDLINE

## (undated) DEVICE — BLADE OPHTH GRN ROUNDED TIP 1 SIDE SHRP GRINDLESS MINI-BLDE

## (undated) DEVICE — DRESSING,GAUZE,XEROFORM,CURAD,1"X8",ST: Brand: CURAD